# Patient Record
Sex: FEMALE | Race: WHITE | NOT HISPANIC OR LATINO | Employment: FULL TIME | URBAN - METROPOLITAN AREA
[De-identification: names, ages, dates, MRNs, and addresses within clinical notes are randomized per-mention and may not be internally consistent; named-entity substitution may affect disease eponyms.]

---

## 2023-04-10 ENCOUNTER — OFFICE VISIT (OUTPATIENT)
Dept: PEDIATRICS | Facility: CLINIC | Age: 23
End: 2023-04-10
Payer: COMMERCIAL

## 2023-04-10 VITALS
RESPIRATION RATE: 24 BRPM | SYSTOLIC BLOOD PRESSURE: 104 MMHG | WEIGHT: 166 LBS | HEIGHT: 65 IN | TEMPERATURE: 97.8 F | DIASTOLIC BLOOD PRESSURE: 60 MMHG | BODY MASS INDEX: 27.66 KG/M2 | HEART RATE: 72 BPM

## 2023-04-10 DIAGNOSIS — F32.A ANXIETY AND DEPRESSION: Primary | ICD-10-CM

## 2023-04-10 DIAGNOSIS — F41.9 ANXIETY AND DEPRESSION: Primary | ICD-10-CM

## 2023-04-10 PROCEDURE — 99213 OFFICE O/P EST LOW 20 MIN: CPT | Performed by: PEDIATRICS

## 2023-04-10 PROCEDURE — 1036F TOBACCO NON-USER: CPT | Performed by: PEDIATRICS

## 2023-04-10 RX ORDER — ESCITALOPRAM OXALATE 20 MG/1
20 TABLET ORAL DAILY
Qty: 90 TABLET | Refills: 0 | Status: SHIPPED | OUTPATIENT
Start: 2023-04-10 | End: 2023-04-12

## 2023-04-10 RX ORDER — ESCITALOPRAM OXALATE 20 MG/1
20 TABLET ORAL DAILY
COMMUNITY
End: 2023-04-12

## 2023-04-10 NOTE — PROGRESS NOTES
Subjective   Patient ID: Yanelis Carrillo is a 22 y.o. female who presents for med follow up (Lexapro 20mg ).  Today she is accompanied by alone.     HPI  On   Current Outpatient Medications:     escitalopram (Lexapro) 20 mg tablet, Take 1 tablet (20 mg) by mouth once daily., Disp: , Rfl:    Compliant Yes  EffectiveYes  Side effects No  Review of Systems    Objective   There were no vitals taken for this visit.  BSA: There is no height or weight on file to calculate BSA.    Physical Exam  Constitutional:       Appearance: Normal appearance.   Cardiovascular:      Rate and Rhythm: Normal rate and regular rhythm.   Pulmonary:      Breath sounds: Normal breath sounds.   Neurological:      General: No focal deficit present.      Mental Status: She is alert.   Psychiatric:         Mood and Affect: Mood normal.         Assessment/Plan   1. Anxiety and depression  escitalopram (Lexapro) 20 mg tablet    DISCONTINUED: escitalopram (Lexapro) 20 mg tablet

## 2023-04-12 RX ORDER — ESCITALOPRAM OXALATE 20 MG/1
20 TABLET ORAL DAILY
Qty: 90 TABLET | Refills: 0 | Status: SHIPPED | OUTPATIENT
Start: 2023-04-12 | End: 2023-07-10 | Stop reason: SDUPTHER

## 2023-07-03 ENCOUNTER — APPOINTMENT (OUTPATIENT)
Dept: PRIMARY CARE | Facility: CLINIC | Age: 23
End: 2023-07-03
Payer: COMMERCIAL

## 2023-07-10 DIAGNOSIS — F41.9 ANXIETY AND DEPRESSION: ICD-10-CM

## 2023-07-10 DIAGNOSIS — F32.A ANXIETY AND DEPRESSION: ICD-10-CM

## 2023-07-10 RX ORDER — ESCITALOPRAM OXALATE 20 MG/1
20 TABLET ORAL DAILY
Qty: 90 TABLET | Refills: 0 | Status: SHIPPED | OUTPATIENT
Start: 2023-07-10 | End: 2023-10-04 | Stop reason: SDUPTHER

## 2023-07-10 NOTE — TELEPHONE ENCOUNTER
Can you call and assist patient with scheduling. She is due for an apt and requested a refill on her medication.    Unsure when she is out of medication. If she is running out prior to apt please route back to me to pend and send to Dr Ayala.    Also when scheduling can you verify if she is still seeing Dr Ayala as Dr Coffey is here PCP in the system.

## 2023-07-10 NOTE — TELEPHONE ENCOUNTER
Patient requests prescription below    Last Office Visit: 4/10/2023     Requested Prescriptions     Pending Prescriptions Disp Refills    escitalopram (Lexapro) 20 mg tablet 90 tablet 0     Sig: Take 1 tablet (20 mg) by mouth once daily.

## 2023-08-31 LAB
CLUE CELLS: ABNORMAL
NUGENT SCORE: 1
YEAST: PRESENT

## 2023-09-21 ENCOUNTER — OFFICE VISIT (OUTPATIENT)
Dept: PRIMARY CARE CLINIC | Age: 23
End: 2023-09-21
Payer: COMMERCIAL

## 2023-09-21 VITALS
HEART RATE: 65 BPM | SYSTOLIC BLOOD PRESSURE: 120 MMHG | WEIGHT: 176 LBS | BODY MASS INDEX: 28.28 KG/M2 | HEIGHT: 66 IN | DIASTOLIC BLOOD PRESSURE: 74 MMHG | OXYGEN SATURATION: 98 % | TEMPERATURE: 99.2 F

## 2023-09-21 DIAGNOSIS — J01.90 ACUTE SINUSITIS, RECURRENCE NOT SPECIFIED, UNSPECIFIED LOCATION: Primary | ICD-10-CM

## 2023-09-21 DIAGNOSIS — R52 GENERALIZED BODY ACHES: ICD-10-CM

## 2023-09-21 DIAGNOSIS — R68.89 FLU-LIKE SYMPTOMS: ICD-10-CM

## 2023-09-21 DIAGNOSIS — J02.9 SORE THROAT: ICD-10-CM

## 2023-09-21 LAB
INFLUENZA A ANTIBODY: NEGATIVE
INFLUENZA B ANTIBODY: NEGATIVE
Lab: NORMAL
PERFORMING INSTRUMENT: NORMAL
QC PASS/FAIL: NORMAL
S PYO AG THROAT QL: NORMAL
SARS-COV-2, POC: NORMAL

## 2023-09-21 PROCEDURE — 87426 SARSCOV CORONAVIRUS AG IA: CPT | Performed by: STUDENT IN AN ORGANIZED HEALTH CARE EDUCATION/TRAINING PROGRAM

## 2023-09-21 PROCEDURE — G8427 DOCREV CUR MEDS BY ELIG CLIN: HCPCS | Performed by: STUDENT IN AN ORGANIZED HEALTH CARE EDUCATION/TRAINING PROGRAM

## 2023-09-21 PROCEDURE — 87804 INFLUENZA ASSAY W/OPTIC: CPT | Performed by: STUDENT IN AN ORGANIZED HEALTH CARE EDUCATION/TRAINING PROGRAM

## 2023-09-21 PROCEDURE — 4004F PT TOBACCO SCREEN RCVD TLK: CPT | Performed by: STUDENT IN AN ORGANIZED HEALTH CARE EDUCATION/TRAINING PROGRAM

## 2023-09-21 PROCEDURE — G8419 CALC BMI OUT NRM PARAM NOF/U: HCPCS | Performed by: STUDENT IN AN ORGANIZED HEALTH CARE EDUCATION/TRAINING PROGRAM

## 2023-09-21 PROCEDURE — 99203 OFFICE O/P NEW LOW 30 MIN: CPT | Performed by: STUDENT IN AN ORGANIZED HEALTH CARE EDUCATION/TRAINING PROGRAM

## 2023-09-21 PROCEDURE — 87880 STREP A ASSAY W/OPTIC: CPT | Performed by: STUDENT IN AN ORGANIZED HEALTH CARE EDUCATION/TRAINING PROGRAM

## 2023-09-21 RX ORDER — ESCITALOPRAM OXALATE 20 MG/1
20 TABLET ORAL DAILY
COMMUNITY
Start: 2023-07-10

## 2023-09-21 SDOH — ECONOMIC STABILITY: INCOME INSECURITY: HOW HARD IS IT FOR YOU TO PAY FOR THE VERY BASICS LIKE FOOD, HOUSING, MEDICAL CARE, AND HEATING?: NOT HARD AT ALL

## 2023-09-21 SDOH — ECONOMIC STABILITY: HOUSING INSECURITY
IN THE LAST 12 MONTHS, WAS THERE A TIME WHEN YOU DID NOT HAVE A STEADY PLACE TO SLEEP OR SLEPT IN A SHELTER (INCLUDING NOW)?: NO

## 2023-09-21 SDOH — ECONOMIC STABILITY: FOOD INSECURITY: WITHIN THE PAST 12 MONTHS, THE FOOD YOU BOUGHT JUST DIDN'T LAST AND YOU DIDN'T HAVE MONEY TO GET MORE.: NEVER TRUE

## 2023-09-21 SDOH — ECONOMIC STABILITY: FOOD INSECURITY: WITHIN THE PAST 12 MONTHS, YOU WORRIED THAT YOUR FOOD WOULD RUN OUT BEFORE YOU GOT MONEY TO BUY MORE.: NEVER TRUE

## 2023-09-21 ASSESSMENT — PATIENT HEALTH QUESTIONNAIRE - PHQ9
1. LITTLE INTEREST OR PLEASURE IN DOING THINGS: 0
SUM OF ALL RESPONSES TO PHQ QUESTIONS 1-9: 0
SUM OF ALL RESPONSES TO PHQ9 QUESTIONS 1 & 2: 0
SUM OF ALL RESPONSES TO PHQ QUESTIONS 1-9: 0
SUM OF ALL RESPONSES TO PHQ QUESTIONS 1-9: 0
2. FEELING DOWN, DEPRESSED OR HOPELESS: 0
SUM OF ALL RESPONSES TO PHQ QUESTIONS 1-9: 0

## 2023-09-21 NOTE — PATIENT INSTRUCTIONS
It is likely you have a viral infection as 90% of upper respiratory infections are viral.  The following treatments below are recommended for your symptoms. Please try these and reach out if your symptoms have not improved after 10 days from when they began.   -Vitamin C 1000 mg daily for 3 to 5 days  -Tylenol 500 to 1000 mg up to 3 times daily for aches and chills and fever  -Jazzy pot/saline nasal rinses/Sudafed/Flonase for nasal congestion, sinus pressure, nasal drainage  -Cepacol or Chloraseptic throat spray for throat pain  -Mucinex for cough

## 2023-10-02 ENCOUNTER — PATIENT MESSAGE (OUTPATIENT)
Dept: PEDIATRICS | Facility: CLINIC | Age: 23
End: 2023-10-02
Payer: COMMERCIAL

## 2023-10-02 DIAGNOSIS — F32.A ANXIETY AND DEPRESSION: ICD-10-CM

## 2023-10-02 DIAGNOSIS — F41.9 ANXIETY AND DEPRESSION: ICD-10-CM

## 2023-10-03 NOTE — PATIENT COMMUNICATION
Patient is requesting a refill on medication.    Initially advised patient to schedule apt prior to leaving but she leaves Thursday. Sent message offering apt today.  Wait reply

## 2023-10-04 NOTE — PATIENT COMMUNICATION
Patient unable to come in for apt prior to leaving.    Are you willing to refill medication?     Requested Prescriptions     Pending Prescriptions Disp Refills    escitalopram (Lexapro) 20 mg tablet 90 tablet 0     Sig: Take 1 tablet (20 mg) by mouth once daily.

## 2023-10-05 RX ORDER — ESCITALOPRAM OXALATE 20 MG/1
20 TABLET ORAL DAILY
Qty: 90 TABLET | Refills: 0 | Status: SHIPPED | OUTPATIENT
Start: 2023-10-05 | End: 2024-01-08 | Stop reason: SDUPTHER

## 2023-11-07 ENCOUNTER — APPOINTMENT (OUTPATIENT)
Dept: PRIMARY CARE | Facility: CLINIC | Age: 23
End: 2023-11-07
Payer: COMMERCIAL

## 2023-11-13 ENCOUNTER — HOSPITAL ENCOUNTER (INPATIENT)
Facility: HOSPITAL | Age: 23
LOS: 1 days | Discharge: ANOTHER HOSPITAL | End: 2023-11-14
Attending: EMERGENCY MEDICINE | Admitting: INTERNAL MEDICINE
Payer: COMMERCIAL

## 2023-11-13 ENCOUNTER — APPOINTMENT (OUTPATIENT)
Dept: RADIOLOGY | Facility: HOSPITAL | Age: 23
End: 2023-11-13
Payer: COMMERCIAL

## 2023-11-13 DIAGNOSIS — I44.2 THIRD DEGREE HEART BLOCK (MULTI): Primary | ICD-10-CM

## 2023-11-13 PROBLEM — F32.A ANXIETY AND DEPRESSION: Status: ACTIVE | Noted: 2023-11-13

## 2023-11-13 PROBLEM — F41.9 ANXIETY AND DEPRESSION: Status: ACTIVE | Noted: 2023-11-13

## 2023-11-13 PROBLEM — I44.1 SECOND DEGREE AV BLOCK: Status: ACTIVE | Noted: 2023-11-13

## 2023-11-13 PROBLEM — D64.9 ANEMIA: Status: ACTIVE | Noted: 2023-11-13

## 2023-11-13 PROBLEM — I82.890 SUPERFICIAL VEIN THROMBOSIS: Status: ACTIVE | Noted: 2023-11-13

## 2023-11-13 PROBLEM — O13.9 GESTATIONAL HYPERTENSION (HHS-HCC): Status: ACTIVE | Noted: 2023-11-13

## 2023-11-13 PROBLEM — I82.890 SUPERFICIAL VEIN THROMBOSIS: Status: RESOLVED | Noted: 2023-11-13 | Resolved: 2023-11-13

## 2023-11-13 PROBLEM — I82.819 ACUTE SUPERFICIAL VENOUS THROMBOSIS OF LOWER EXTREMITY: Status: ACTIVE | Noted: 2023-11-13

## 2023-11-13 LAB
ALBUMIN SERPL BCP-MCNC: 3.5 G/DL (ref 3.4–5)
ALP SERPL-CCNC: 100 U/L (ref 33–110)
ALT SERPL W P-5'-P-CCNC: 16 U/L (ref 7–45)
ANION GAP SERPL CALC-SCNC: 9 MMOL/L (ref 10–20)
AST SERPL W P-5'-P-CCNC: 17 U/L (ref 9–39)
B-HCG SERPL-ACNC: <2 MIU/ML
BASOPHILS # BLD AUTO: 0.04 X10*3/UL (ref 0–0.1)
BASOPHILS NFR BLD AUTO: 0.7 %
BILIRUB SERPL-MCNC: 0.3 MG/DL (ref 0–1.2)
BUN SERPL-MCNC: 12 MG/DL (ref 6–23)
CALCIUM SERPL-MCNC: 9 MG/DL (ref 8.6–10.3)
CARDIAC TROPONIN I PNL SERPL HS: 3 NG/L (ref 0–13)
CARDIAC TROPONIN I PNL SERPL HS: 3 NG/L (ref 0–13)
CHLORIDE SERPL-SCNC: 103 MMOL/L (ref 98–107)
CO2 SERPL-SCNC: 27 MMOL/L (ref 21–32)
CREAT SERPL-MCNC: 0.81 MG/DL (ref 0.5–1.05)
EOSINOPHIL # BLD AUTO: 0.11 X10*3/UL (ref 0–0.7)
EOSINOPHIL NFR BLD AUTO: 2 %
ERYTHROCYTE [DISTWIDTH] IN BLOOD BY AUTOMATED COUNT: 15.1 % (ref 11.5–14.5)
GFR SERPL CREATININE-BSD FRML MDRD: >90 ML/MIN/1.73M*2
GLUCOSE SERPL-MCNC: 94 MG/DL (ref 74–99)
HCT VFR BLD AUTO: 40.3 % (ref 36–46)
HGB BLD-MCNC: 11.9 G/DL (ref 12–16)
IMM GRANULOCYTES # BLD AUTO: 0.01 X10*3/UL (ref 0–0.7)
IMM GRANULOCYTES NFR BLD AUTO: 0.2 % (ref 0–0.9)
LYMPHOCYTES # BLD AUTO: 1.99 X10*3/UL (ref 1.2–4.8)
LYMPHOCYTES NFR BLD AUTO: 35.3 %
MAGNESIUM SERPL-MCNC: 1.98 MG/DL (ref 1.6–2.4)
MCH RBC QN AUTO: 23.4 PG (ref 26–34)
MCHC RBC AUTO-ENTMCNC: 29.5 G/DL (ref 32–36)
MCV RBC AUTO: 79 FL (ref 80–100)
MONOCYTES # BLD AUTO: 0.42 X10*3/UL (ref 0.1–1)
MONOCYTES NFR BLD AUTO: 7.5 %
NEUTROPHILS # BLD AUTO: 3.06 X10*3/UL (ref 1.2–7.7)
NEUTROPHILS NFR BLD AUTO: 54.3 %
NRBC BLD-RTO: 0 /100 WBCS (ref 0–0)
PLATELET # BLD AUTO: 232 X10*3/UL (ref 150–450)
POTASSIUM SERPL-SCNC: 4 MMOL/L (ref 3.5–5.3)
PROT SERPL-MCNC: 7.2 G/DL (ref 6.4–8.2)
RBC # BLD AUTO: 5.08 X10*6/UL (ref 4–5.2)
SODIUM SERPL-SCNC: 135 MMOL/L (ref 136–145)
WBC # BLD AUTO: 5.6 X10*3/UL (ref 4.4–11.3)

## 2023-11-13 PROCEDURE — 83036 HEMOGLOBIN GLYCOSYLATED A1C: CPT | Mod: STJLAB | Performed by: NURSE PRACTITIONER

## 2023-11-13 PROCEDURE — 85025 COMPLETE CBC W/AUTO DIFF WBC: CPT | Performed by: STUDENT IN AN ORGANIZED HEALTH CARE EDUCATION/TRAINING PROGRAM

## 2023-11-13 PROCEDURE — 84484 ASSAY OF TROPONIN QUANT: CPT | Performed by: STUDENT IN AN ORGANIZED HEALTH CARE EDUCATION/TRAINING PROGRAM

## 2023-11-13 PROCEDURE — 36415 COLL VENOUS BLD VENIPUNCTURE: CPT | Performed by: EMERGENCY MEDICINE

## 2023-11-13 PROCEDURE — 84702 CHORIONIC GONADOTROPIN TEST: CPT | Performed by: STUDENT IN AN ORGANIZED HEALTH CARE EDUCATION/TRAINING PROGRAM

## 2023-11-13 PROCEDURE — 84484 ASSAY OF TROPONIN QUANT: CPT | Performed by: EMERGENCY MEDICINE

## 2023-11-13 PROCEDURE — 84443 ASSAY THYROID STIM HORMONE: CPT | Performed by: NURSE PRACTITIONER

## 2023-11-13 PROCEDURE — 80053 COMPREHEN METABOLIC PANEL: CPT | Performed by: EMERGENCY MEDICINE

## 2023-11-13 PROCEDURE — 36415 COLL VENOUS BLD VENIPUNCTURE: CPT | Performed by: STUDENT IN AN ORGANIZED HEALTH CARE EDUCATION/TRAINING PROGRAM

## 2023-11-13 PROCEDURE — 84439 ASSAY OF FREE THYROXINE: CPT | Performed by: NURSE PRACTITIONER

## 2023-11-13 PROCEDURE — 71045 X-RAY EXAM CHEST 1 VIEW: CPT | Mod: FR

## 2023-11-13 PROCEDURE — 99285 EMERGENCY DEPT VISIT HI MDM: CPT | Mod: 25 | Performed by: EMERGENCY MEDICINE

## 2023-11-13 PROCEDURE — 83735 ASSAY OF MAGNESIUM: CPT | Performed by: NURSE PRACTITIONER

## 2023-11-13 PROCEDURE — 85025 COMPLETE CBC W/AUTO DIFF WBC: CPT | Performed by: EMERGENCY MEDICINE

## 2023-11-13 PROCEDURE — 99285 EMERGENCY DEPT VISIT HI MDM: CPT | Performed by: EMERGENCY MEDICINE

## 2023-11-13 PROCEDURE — 2500000001 HC RX 250 WO HCPCS SELF ADMINISTERED DRUGS (ALT 637 FOR MEDICARE OP): Performed by: NURSE PRACTITIONER

## 2023-11-13 PROCEDURE — 84702 CHORIONIC GONADOTROPIN TEST: CPT | Performed by: EMERGENCY MEDICINE

## 2023-11-13 PROCEDURE — 80053 COMPREHEN METABOLIC PANEL: CPT | Performed by: STUDENT IN AN ORGANIZED HEALTH CARE EDUCATION/TRAINING PROGRAM

## 2023-11-13 PROCEDURE — 71045 X-RAY EXAM CHEST 1 VIEW: CPT | Mod: FOREIGN READ | Performed by: RADIOLOGY

## 2023-11-13 PROCEDURE — 99222 1ST HOSP IP/OBS MODERATE 55: CPT | Performed by: NURSE PRACTITIONER

## 2023-11-13 PROCEDURE — 99222 1ST HOSP IP/OBS MODERATE 55: CPT | Performed by: INTERNAL MEDICINE

## 2023-11-13 RX ORDER — SODIUM CHLORIDE 9 MG/ML
75 INJECTION, SOLUTION INTRAVENOUS CONTINUOUS
Status: DISCONTINUED | OUTPATIENT
Start: 2023-11-13 | End: 2023-11-14 | Stop reason: HOSPADM

## 2023-11-13 RX ORDER — POLYETHYLENE GLYCOL 3350 17 G/17G
17 POWDER, FOR SOLUTION ORAL DAILY PRN
Status: DISCONTINUED | OUTPATIENT
Start: 2023-11-13 | End: 2023-11-14 | Stop reason: HOSPADM

## 2023-11-13 RX ORDER — ACETAMINOPHEN 650 MG/1
650 SUPPOSITORY RECTAL EVERY 4 HOURS PRN
Status: DISCONTINUED | OUTPATIENT
Start: 2023-11-13 | End: 2023-11-14 | Stop reason: HOSPADM

## 2023-11-13 RX ORDER — ESCITALOPRAM OXALATE 20 MG/1
20 TABLET ORAL NIGHTLY
Status: DISCONTINUED | OUTPATIENT
Start: 2023-11-13 | End: 2023-11-14 | Stop reason: HOSPADM

## 2023-11-13 RX ORDER — ACETAMINOPHEN 325 MG/1
650 TABLET ORAL EVERY 4 HOURS PRN
Status: DISCONTINUED | OUTPATIENT
Start: 2023-11-13 | End: 2023-11-14 | Stop reason: HOSPADM

## 2023-11-13 RX ORDER — TALC
3 POWDER (GRAM) TOPICAL DAILY PRN
Status: DISCONTINUED | OUTPATIENT
Start: 2023-11-13 | End: 2023-11-14 | Stop reason: HOSPADM

## 2023-11-13 RX ORDER — ACETAMINOPHEN 160 MG/5ML
650 SOLUTION ORAL EVERY 4 HOURS PRN
Status: DISCONTINUED | OUTPATIENT
Start: 2023-11-13 | End: 2023-11-14 | Stop reason: HOSPADM

## 2023-11-13 ASSESSMENT — LIFESTYLE VARIABLES
HAVE PEOPLE ANNOYED YOU BY CRITICIZING YOUR DRINKING: NO
HAVE YOU EVER FELT YOU SHOULD CUT DOWN ON YOUR DRINKING: NO
REASON UNABLE TO ASSESS: NO
EVER HAD A DRINK FIRST THING IN THE MORNING TO STEADY YOUR NERVES TO GET RID OF A HANGOVER: NO
EVER FELT BAD OR GUILTY ABOUT YOUR DRINKING: NO

## 2023-11-13 ASSESSMENT — COLUMBIA-SUICIDE SEVERITY RATING SCALE - C-SSRS
1. IN THE PAST MONTH, HAVE YOU WISHED YOU WERE DEAD OR WISHED YOU COULD GO TO SLEEP AND NOT WAKE UP?: NO
6. HAVE YOU EVER DONE ANYTHING, STARTED TO DO ANYTHING, OR PREPARED TO DO ANYTHING TO END YOUR LIFE?: NO
2. HAVE YOU ACTUALLY HAD ANY THOUGHTS OF KILLING YOURSELF?: NO

## 2023-11-13 ASSESSMENT — PAIN DESCRIPTION - LOCATION: LOCATION: CHEST

## 2023-11-13 ASSESSMENT — PAIN - FUNCTIONAL ASSESSMENT: PAIN_FUNCTIONAL_ASSESSMENT: 0-10

## 2023-11-13 ASSESSMENT — PAIN SCALES - GENERAL
PAINLEVEL_OUTOF10: 3
PAINLEVEL_OUTOF10: 4
PAINLEVEL_OUTOF10: 5 - MODERATE PAIN
PAINLEVEL_OUTOF10: 5 - MODERATE PAIN

## 2023-11-13 ASSESSMENT — PAIN DESCRIPTION - DESCRIPTORS
DESCRIPTORS: ACHING
DESCRIPTORS: ACHING

## 2023-11-13 NOTE — CONSULTS
Consults\  Reason for Consult: Dizziness and chest pain    Assessment/Plan:    Third-degree heart block: EKG shows a sinus rate of nearly 100 bpm with a junctional escape rhythm of around 50 bpm with no evidence of AV conduction.  EKG is similar to that of July 2022.  Unclear etiology.  Certainly infiltrative disease needs to be considered however typically this presents with QRS widening.  Cardiac MRI is certainly warranted.  There is a chance this actually may be congenital heart block that she is gone unnoticed over time.  I discussed case with electrophysiology.  Given the new symptoms we felt referral to [facility] for cardiac MRI and possible diagnostic electrophysiology study was warranted.  Patient and father were agreeable with that plan.          History Of Present Illness:    Yanelis Carrillo is a 23 y.o. female presenting with chest discomfort and dizziness.  Patient works as an MA.  She was diagnosed with some type of arrhythmia in July of last year after giving birth.  Her EKG is read as second-degree heart block however do not agree with that interpretation.  She had echocardiogram performed at that time showing no significant valvular heart disease and normal LV systolic function.  She has never had a syncopal event.  Fortunately over the last day she has had some atypical sharp chest discomfort that comes and goes.  She does have a history of anxiety as well.  In addition she describes some dizziness.  The dizziness was somewhat reproduced with changes in head position.  She can feel her heart beating forcefully but not irregularly nor fast.  She really does not exercise much.  She does not track her heart rate with a Fitbit or with a watch.  Again no syncopal events.  No family history of sudden cardiac death.  Mother has some type of valvular heart disease that is ill-defined.  Father has a low resting heart rate but no issues with significant symptomatic bradycardia..       Past Medical History:  She has a  past medical history of Pain in unspecified ankle and joints of unspecified foot, Personal history of other (healed) physical injury and trauma, Personal history of other (healed) physical injury and trauma, Personal history of other diseases of the female genital tract, Personal history of other diseases of the respiratory system, and Personal history of other specified conditions.    Past Surgical History:  She has a past surgical history that includes Other surgical history (08/09/2019).    Problem List:  There is no problem list on file for this patient.      Social History:  She reports that she has never smoked. She has never been exposed to tobacco smoke. She has never used smokeless tobacco. No history on file for alcohol use and drug use.    Family History:  No family history on file.     Allergies:  Patient has no known allergies.    Inpatient Medications:        Outpatient Medications:  Current Outpatient Medications   Medication Instructions    escitalopram (LEXAPRO) 20 mg, oral, Daily       Last Recorded Vitals:  Vitals:    11/13/23 1200 11/13/23 1300 11/13/23 1330 11/13/23 1400   BP: 131/70 145/70 145/70    Pulse: 50 54 52 54   Resp: 18 18 26 20   Temp:       SpO2: 99% 100% 99% 99%   Weight:       Height:         Last I/O:  No intake/output data recorded.    Physical Exam  Vitals reviewed.   Constitutional:       Appearance: Normal appearance.   Eyes:      Pupils: Pupils are equal, round, and reactive to light.   Neck:      Vascular: No JVD.   Cardiovascular:      Rate and Rhythm: Regular rhythm. Bradycardia present.      Pulses: Normal pulses.      Heart sounds: No murmur heard.     No gallop.   Pulmonary:      Effort: No respiratory distress.      Breath sounds: No wheezing or rales.   Abdominal:      General: Abdomen is flat. There is no distension.      Palpations: Abdomen is soft.   Musculoskeletal:         General: No swelling.      Right lower leg: No edema.      Left lower leg: No edema.    Neurological:      General: No focal deficit present.      Mental Status: She is alert.   Psychiatric:         Mood and Affect: Mood normal.            Last Labs:  CBC - 11/13/2023: 11:28 AM  5.6 11.9 232    40.3      CMP - 11/13/2023: 11:28 AM  9.0 7.2 17 --- 0.3   _ 3.5 16 100      Troponin I, High Sensitivity   Date/Time Value Ref Range Status   11/13/2023 01:36 PM 3 0 - 13 ng/L Final   11/13/2023 11:28 AM 3 0 - 13 ng/L Final     Hemoglobin A1C   Date/Time Value Ref Range Status   07/24/2022 03:30 AM 5.6 % Final     Comment:          Diagnosis of Diabetes-Adults   Non-Diabetic: < or = 5.6%   Increased risk for developing diabetes: 5.7-6.4%   Diagnostic of diabetes: > or = 6.5%  .       Monitoring of Diabetes                Age (y)     Therapeutic Goal (%)   Adults:          >18           <7.0   Pediatrics:    13-18           <7.5                   7-12           <8.0                   0- 6            7.5-8.5   American Diabetes Association. Diabetes Care 33(S1), Jan 2010.       VLDL   Date/Time Value Ref Range Status   07/24/2022 03:30 AM 36 0 - 40 mg/dL Final     ECG normal sinus rhythm with a heart rate of around 110 bpm with complete heart block and a junctional escape with a narrow complex QRS at a rate of about 50-54 bpm.  No evidence of AV conduction.    Paul Hagan MD
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
## 2023-11-13 NOTE — ED NOTES
1831 spoke with Uriel for an update on patient to be transferred   Awaiting a bed      Michelle Suero, EMT  11/13/23 1832

## 2023-11-14 ENCOUNTER — DOCUMENTATION (OUTPATIENT)
Dept: RADIOLOGY | Facility: HOSPITAL | Age: 23
End: 2023-11-14
Payer: COMMERCIAL

## 2023-11-14 ENCOUNTER — HOSPITAL ENCOUNTER (INPATIENT)
Facility: HOSPITAL | Age: 23
LOS: 3 days | Discharge: HOME | End: 2023-11-17
Attending: INTERNAL MEDICINE | Admitting: INTERNAL MEDICINE
Payer: COMMERCIAL

## 2023-11-14 ENCOUNTER — APPOINTMENT (OUTPATIENT)
Dept: CARDIOLOGY | Facility: HOSPITAL | Age: 23
End: 2023-11-14
Payer: COMMERCIAL

## 2023-11-14 VITALS
SYSTOLIC BLOOD PRESSURE: 115 MMHG | DIASTOLIC BLOOD PRESSURE: 58 MMHG | HEART RATE: 55 BPM | TEMPERATURE: 98.2 F | BODY MASS INDEX: 28.34 KG/M2 | RESPIRATION RATE: 18 BRPM | WEIGHT: 176.37 LBS | HEIGHT: 66 IN | OXYGEN SATURATION: 98 %

## 2023-11-14 DIAGNOSIS — I45.9 HEART BLOCK: Primary | ICD-10-CM

## 2023-11-14 DIAGNOSIS — F41.9 ANXIETY AND DEPRESSION: ICD-10-CM

## 2023-11-14 DIAGNOSIS — I44.2 THIRD DEGREE HEART BLOCK (MULTI): ICD-10-CM

## 2023-11-14 DIAGNOSIS — F32.A ANXIETY AND DEPRESSION: ICD-10-CM

## 2023-11-14 LAB
AMPHETAMINES UR QL SCN: NORMAL
ANION GAP SERPL CALC-SCNC: 12 MMOL/L (ref 10–20)
BARBITURATES UR QL SCN: NORMAL
BENZODIAZ UR QL SCN: NORMAL
BUN SERPL-MCNC: 13 MG/DL (ref 6–23)
BZE UR QL SCN: NORMAL
CALCIUM SERPL-MCNC: 8.3 MG/DL (ref 8.6–10.3)
CANNABINOIDS UR QL SCN: NORMAL
CHLORIDE SERPL-SCNC: 107 MMOL/L (ref 98–107)
CHOLEST SERPL-MCNC: 170 MG/DL (ref 0–199)
CHOLESTEROL/HDL RATIO: 3.6
CO2 SERPL-SCNC: 24 MMOL/L (ref 21–32)
CREAT SERPL-MCNC: 0.83 MG/DL (ref 0.5–1.05)
ERYTHROCYTE [DISTWIDTH] IN BLOOD BY AUTOMATED COUNT: 15.3 % (ref 11.5–14.5)
EST. AVERAGE GLUCOSE BLD GHB EST-MCNC: 111 MG/DL
FENTANYL+NORFENTANYL UR QL SCN: NORMAL
FERRITIN SERPL-MCNC: 12 NG/ML (ref 8–150)
FOLATE SERPL-MCNC: 13.4 NG/ML
GFR SERPL CREATININE-BSD FRML MDRD: >90 ML/MIN/1.73M*2
GLUCOSE SERPL-MCNC: 99 MG/DL (ref 74–99)
HBA1C MFR BLD: 5.5 %
HCT VFR BLD AUTO: 38.4 % (ref 36–46)
HDLC SERPL-MCNC: 47.6 MG/DL
HGB BLD-MCNC: 11.3 G/DL (ref 12–16)
HOLD SPECIMEN: NORMAL
IRON SATN MFR SERPL: ABNORMAL %
IRON SERPL-MCNC: 56 UG/DL (ref 35–150)
LDLC SERPL CALC-MCNC: 108 MG/DL
MAGNESIUM SERPL-MCNC: 2.13 MG/DL (ref 1.6–2.4)
MCH RBC QN AUTO: 23.1 PG (ref 26–34)
MCHC RBC AUTO-ENTMCNC: 29.4 G/DL (ref 32–36)
MCV RBC AUTO: 78 FL (ref 80–100)
NON HDL CHOLESTEROL: 122 MG/DL (ref 0–149)
NRBC BLD-RTO: 0 /100 WBCS (ref 0–0)
OPIATES UR QL SCN: NORMAL
OXYCODONE+OXYMORPHONE UR QL SCN: NORMAL
PCP UR QL SCN: NORMAL
PHOSPHATE SERPL-MCNC: 4.1 MG/DL (ref 2.5–4.9)
PLATELET # BLD AUTO: 210 X10*3/UL (ref 150–450)
POTASSIUM SERPL-SCNC: 3.8 MMOL/L (ref 3.5–5.3)
RBC # BLD AUTO: 4.9 X10*6/UL (ref 4–5.2)
SODIUM SERPL-SCNC: 139 MMOL/L (ref 136–145)
T4 FREE SERPL-MCNC: 1.05 NG/DL (ref 0.61–1.12)
TIBC SERPL-MCNC: ABNORMAL UG/DL
TRIGL SERPL-MCNC: 70 MG/DL (ref 0–149)
TSH SERPL-ACNC: 2.36 MIU/L (ref 0.44–3.98)
UIBC SERPL-MCNC: >450 UG/DL (ref 110–370)
VIT B12 SERPL-MCNC: 355 PG/ML (ref 211–911)
VLDL: 14 MG/DL (ref 0–40)
WBC # BLD AUTO: 4.9 X10*3/UL (ref 4.4–11.3)

## 2023-11-14 PROCEDURE — 87476 LYME DIS DNA AMP PROBE: CPT

## 2023-11-14 PROCEDURE — 82746 ASSAY OF FOLIC ACID SERUM: CPT | Mod: STJLAB | Performed by: NURSE PRACTITIONER

## 2023-11-14 PROCEDURE — 2500000001 HC RX 250 WO HCPCS SELF ADMINISTERED DRUGS (ALT 637 FOR MEDICARE OP)

## 2023-11-14 PROCEDURE — 2500000004 HC RX 250 GENERAL PHARMACY W/ HCPCS (ALT 636 FOR OP/ED): Performed by: NURSE PRACTITIONER

## 2023-11-14 PROCEDURE — 36415 COLL VENOUS BLD VENIPUNCTURE: CPT

## 2023-11-14 PROCEDURE — 84100 ASSAY OF PHOSPHORUS: CPT | Performed by: NURSE PRACTITIONER

## 2023-11-14 PROCEDURE — 2060000001 HC INTERMEDIATE ICU ROOM DAILY

## 2023-11-14 PROCEDURE — 36415 COLL VENOUS BLD VENIPUNCTURE: CPT | Performed by: NURSE PRACTITIONER

## 2023-11-14 PROCEDURE — 80307 DRUG TEST PRSMV CHEM ANLYZR: CPT | Performed by: NURSE PRACTITIONER

## 2023-11-14 PROCEDURE — 83735 ASSAY OF MAGNESIUM: CPT | Performed by: NURSE PRACTITIONER

## 2023-11-14 PROCEDURE — 82607 VITAMIN B-12: CPT | Mod: STJLAB | Performed by: NURSE PRACTITIONER

## 2023-11-14 PROCEDURE — 80048 BASIC METABOLIC PNL TOTAL CA: CPT | Performed by: NURSE PRACTITIONER

## 2023-11-14 PROCEDURE — 82728 ASSAY OF FERRITIN: CPT | Performed by: NURSE PRACTITIONER

## 2023-11-14 PROCEDURE — 83540 ASSAY OF IRON: CPT | Performed by: NURSE PRACTITIONER

## 2023-11-14 PROCEDURE — 85027 COMPLETE CBC AUTOMATED: CPT | Performed by: NURSE PRACTITIONER

## 2023-11-14 PROCEDURE — 85613 RUSSELL VIPER VENOM DILUTED: CPT

## 2023-11-14 PROCEDURE — 80061 LIPID PANEL: CPT | Performed by: NURSE PRACTITIONER

## 2023-11-14 PROCEDURE — 99223 1ST HOSP IP/OBS HIGH 75: CPT

## 2023-11-14 PROCEDURE — 1200000002 HC GENERAL ROOM WITH TELEMETRY DAILY

## 2023-11-14 PROCEDURE — 93010 ELECTROCARDIOGRAM REPORT: CPT | Performed by: INTERNAL MEDICINE

## 2023-11-14 RX ORDER — ACETAMINOPHEN 160 MG/5ML
650 SOLUTION ORAL EVERY 4 HOURS PRN
Status: DISCONTINUED | OUTPATIENT
Start: 2023-11-14 | End: 2023-11-16

## 2023-11-14 RX ORDER — POLYETHYLENE GLYCOL 3350 17 G/17G
17 POWDER, FOR SOLUTION ORAL DAILY
Status: DISCONTINUED | OUTPATIENT
Start: 2023-11-14 | End: 2023-11-17 | Stop reason: HOSPADM

## 2023-11-14 RX ORDER — ESCITALOPRAM OXALATE 20 MG/1
20 TABLET ORAL DAILY
Status: DISCONTINUED | OUTPATIENT
Start: 2023-11-14 | End: 2023-11-17 | Stop reason: HOSPADM

## 2023-11-14 RX ORDER — ACETAMINOPHEN 650 MG/1
650 SUPPOSITORY RECTAL EVERY 4 HOURS PRN
Status: DISCONTINUED | OUTPATIENT
Start: 2023-11-14 | End: 2023-11-16

## 2023-11-14 RX ORDER — ACETAMINOPHEN 325 MG/1
650 TABLET ORAL EVERY 6 HOURS PRN
Status: DISCONTINUED | OUTPATIENT
Start: 2023-11-14 | End: 2023-11-14

## 2023-11-14 RX ORDER — ACETAMINOPHEN 325 MG/1
650 TABLET ORAL EVERY 4 HOURS PRN
Status: DISCONTINUED | OUTPATIENT
Start: 2023-11-14 | End: 2023-11-16

## 2023-11-14 RX ADMIN — SODIUM CHLORIDE 75 ML/HR: 9 INJECTION, SOLUTION INTRAVENOUS at 00:40

## 2023-11-14 RX ADMIN — ACETAMINOPHEN 650 MG: 325 TABLET ORAL at 07:59

## 2023-11-14 RX ADMIN — ACETAMINOPHEN 650 MG: 325 TABLET ORAL at 18:32

## 2023-11-14 RX ADMIN — ESCITALOPRAM OXALATE 20 MG: 20 TABLET ORAL at 21:42

## 2023-11-14 RX ADMIN — ACETAMINOPHEN 650 MG: 325 TABLET ORAL at 03:52

## 2023-11-14 SDOH — SOCIAL STABILITY: SOCIAL INSECURITY: DO YOU FEEL ANYONE HAS EXPLOITED OR TAKEN ADVANTAGE OF YOU FINANCIALLY OR OF YOUR PERSONAL PROPERTY?: NO

## 2023-11-14 SDOH — SOCIAL STABILITY: SOCIAL INSECURITY: HAS ANYONE EVER THREATENED TO HURT YOUR FAMILY OR YOUR PETS?: NO

## 2023-11-14 SDOH — SOCIAL STABILITY: SOCIAL INSECURITY: ABUSE: ADULT

## 2023-11-14 SDOH — SOCIAL STABILITY: SOCIAL INSECURITY: ARE THERE ANY APPARENT SIGNS OF INJURIES/BEHAVIORS THAT COULD BE RELATED TO ABUSE/NEGLECT?: NO

## 2023-11-14 SDOH — SOCIAL STABILITY: SOCIAL INSECURITY: DO YOU FEEL UNSAFE GOING BACK TO THE PLACE WHERE YOU ARE LIVING?: NO

## 2023-11-14 SDOH — SOCIAL STABILITY: SOCIAL INSECURITY: ARE YOU OR HAVE YOU BEEN THREATENED OR ABUSED PHYSICALLY, EMOTIONALLY, OR SEXUALLY BY ANYONE?: NO

## 2023-11-14 SDOH — SOCIAL STABILITY: SOCIAL INSECURITY: HAVE YOU HAD THOUGHTS OF HARMING ANYONE ELSE?: NO

## 2023-11-14 SDOH — SOCIAL STABILITY: SOCIAL INSECURITY: DOES ANYONE TRY TO KEEP YOU FROM HAVING/CONTACTING OTHER FRIENDS OR DOING THINGS OUTSIDE YOUR HOME?: NO

## 2023-11-14 ASSESSMENT — ACTIVITIES OF DAILY LIVING (ADL)
PATIENT'S MEMORY ADEQUATE TO SAFELY COMPLETE DAILY ACTIVITIES?: YES
ADEQUATE_TO_COMPLETE_ADL: YES
ADEQUATE_TO_COMPLETE_ADL: YES
DRESSING: INDEPENDENT
TOILETING: INDEPENDENT
WALKS_IN_HOME: INDEPENDENTLY
ADL_BEFORE_ADMISSION: INDEPENDENTLY
WALKS IN HOME: INDEPENDENT
TOILETING: INDEPENDENT
HOW_WELL_CAN_YOU_FEED_YOURSELF: INDEPENDENTLY
HEARING - RIGHT EAR: FUNCTIONAL
JUDGMENT_ADEQUATE_SAFELY_COMPLETE_DAILY_ACTIVITIES: YES
HOW_WELL_CAN_YOU_DRESS_YOURSELF: INDEPENDENTLY
LACK_OF_TRANSPORTATION: NO
BATHING: INDEPENDENT
HEARING - LEFT EAR: FUNCTIONAL
DRESSING YOURSELF: INDEPENDENT
ADEQUATE_TO_COMPLETE_ADL: YES
FEEDING YOURSELF: INDEPENDENT
BATHING: INDEPENDENT
HEARING_LEFT_EAR: NO PROBLEMS
HEARING_RIGHT_EAR: NO PROBLEMS
HOW_WELL_CAN_YOU_BATHE_YOURSELF: INDEPENDENTLY
ADL_BEFORE_ADMISSION: RIGHT
GROOMING: INDEPENDENT
HOW_WELL_CAN_YOU_USE_BATHROOM_BY_YOURSELF: INDEPENDENTLY
LACK_OF_TRANSPORTATION: NO
HOW_WELL_CAN_YOU_COMPLETE_GROOMING_TASKS: INDEPENDENTLY
FEEDING: INDEPENDENT

## 2023-11-14 ASSESSMENT — PAIN SCALES - GENERAL
PAINLEVEL_OUTOF10: 0 - NO PAIN
PAINLEVEL_OUTOF10: 4
PAINLEVEL_OUTOF10: 4

## 2023-11-14 ASSESSMENT — COGNITIVE AND FUNCTIONAL STATUS - GENERAL
DAILY ACTIVITIY SCORE: 24
PATIENT BASELINE BEDBOUND: NO
DAILY ACTIVITIY SCORE: 24
MOBILITY SCORE: 24
PATIENT BASELINE BEDBOUND: NO
DAILY ACTIVITIY SCORE: 24
MOBILITY SCORE: 24
MOBILITY SCORE: 24

## 2023-11-14 ASSESSMENT — PATIENT HEALTH QUESTIONNAIRE - PHQ9
SUM OF ALL RESPONSES TO PHQ9 QUESTIONS 1 & 2: 0
1. LITTLE INTEREST OR PLEASURE IN DOING THINGS: NOT AT ALL
2. FEELING DOWN, DEPRESSED OR HOPELESS: NOT AT ALL

## 2023-11-14 ASSESSMENT — LIFESTYLE VARIABLES
PRESCIPTION_ABUSE_PAST_12_MONTHS: NO
AUDIT-C TOTAL SCORE: 1
SUBSTANCE_ABUSE_PAST_12_MONTHS: NO
HOW MANY STANDARD DRINKS CONTAINING ALCOHOL DO YOU HAVE ON A TYPICAL DAY: 1 OR 2
HOW OFTEN DO YOU HAVE A DRINK CONTAINING ALCOHOL: MONTHLY OR LESS
SKIP TO QUESTIONS 9-10: 1
AUDIT-C TOTAL SCORE: 1
HOW OFTEN DO YOU HAVE 6 OR MORE DRINKS ON ONE OCCASION: NEVER

## 2023-11-14 ASSESSMENT — PAIN - FUNCTIONAL ASSESSMENT
PAIN_FUNCTIONAL_ASSESSMENT: 0-10

## 2023-11-14 ASSESSMENT — COLUMBIA-SUICIDE SEVERITY RATING SCALE - C-SSRS
1. IN THE PAST MONTH, HAVE YOU WISHED YOU WERE DEAD OR WISHED YOU COULD GO TO SLEEP AND NOT WAKE UP?: NO
2. HAVE YOU ACTUALLY HAD ANY THOUGHTS OF KILLING YOURSELF?: NO
6. HAVE YOU EVER DONE ANYTHING, STARTED TO DO ANYTHING, OR PREPARED TO DO ANYTHING TO END YOUR LIFE?: NO

## 2023-11-14 ASSESSMENT — PAIN DESCRIPTION - DESCRIPTORS: DESCRIPTORS: CRAMPING

## 2023-11-14 NOTE — H&P
History Of Present Illness  Yanelis Carrillo is a 23 y.o. female presenting to [facility] on 11/13/2023 with PMHx significant for 2nd degree AV block in 7/2022 (postpartum) (ECHO 7/24/2022 with a EF 60 to 65%-showing patient was in A-fib), anemia, anxiety, and depression. The patient presents to after transfer from OSH where she had with complaints of chest pain and lightheadedness. Patient reports that the episodes started last night, describes it as sharp midsternal, and radiating, and last about 5-10 seconds intermittently with no clear causal factors.  Patient continues to rate pain 4/10 on the pain scale.  Patient denies taking new medications or illicit drugs.  During interview the patient denies any recent fever/chills, headache, dizziness, palpitations, shortness of breath, cough, abdominal pain, N/V/D/C, dysuria, or hematuria. She reports that she is still weaning of lactation.    While in the ED at OSH, renal and liver function WNL.  High-sensitivity troponin 1 negative x2 at 3> 3.  CBC with WBC, hemoglobin, and hematocrit WNL.  Chest x-ray negative.  EKG: Third-degree AV block, ventricular rate 51, VA *, QRS 84, QTc 100. No ST elevation or depression noted.  ED physician spoke to Dr. Hagan who recommended patient be transferred to [facility] for further evaluation with cardiac MRI and possible diagnostic EP study.      Past Medical History  She has a past medical history of Acute superficial venous thrombosis of lower extremity (11/13/2023), Anemia (11/13/2023), Anxiety and depression (11/13/2023), Gestational hypertension (11/13/2023), Pain in unspecified ankle and joints of unspecified foot, Personal history of other (healed) physical injury and trauma, Personal history of other (healed) physical injury and trauma, Personal history of other diseases of the female genital tract, Personal history of other diseases of the respiratory system, Personal history of other specified conditions, and Second degree AV block  (2023).    Surgical History  She has a past surgical history that includes  section, low transverse (2022).     Social History  She reports that she has never smoked. She has never been exposed to tobacco smoke. She has never used smokeless tobacco. She reports that she does not drink alcohol and does not use drugs.    Family History  Family History   Problem Relation Name Age of Onset    Other (sinus tachycardia) Mother      Skin cancer Paternal Grandfather          Allergies  Cefdinir, Metronidazole, Peanut, and Cephalexin      ROS Negative except as per HPI     Physical Exam  Constitutional: Awake and alert, Calm, and Cooperative. Speech clear. No acute distress.  Skin: Pink, warm, and dry. No lesions or rashes.  Eyes: PERRL, sclera clear, No swelling or drainage noted  ENMT: Mucous membranes pink and moist, no apparent injury, no lesions seen  Head/Neck: Neck Supple, no apparent injury, trachea midline, no palpable masses on head  Cardiac: Regular rhythm with bradycardia, Auscultated S1 & S2, no murmurs  Lungs: CTAB, symmetrical chest rise, no accessory muscle usage, unlabored  Abdomen: Soft, non-tender, no rebound tenderness or guarding, nondistended, positive bowel sounds in all quadrants  Musculoskeletal: ROM intact, no joint swelling, normal strength  Extremities: No edema, No cyanosis, 2+ pulses of the extremities, see skin assessment for wounds  Neurological: Alert and Oriented x 3, intact senses, bilateral hand grasps and foot push/pulls equal.  Psychological: Appropriate mood and behavior.       Last Recorded Vitals  Blood pressure 128/64, pulse 56, temperature 37 °C (98.6 °F), resp. rate 18, last menstrual period 2023, SpO2 98 %.  Visit Vitals  /64   Pulse 56   Temp 37 °C (98.6 °F)   Resp 18   LMP 2023 Comment: negative pregnancy test today   SpO2 98%   OB Status Having periods   Smoking Status Never         Pain Assessment: 0-10  Pain Score: 4  Pain Type: Acute  pain  Pain Location: Abdomen  Pain Descriptors: Cramping        Relevant Results  Results for orders placed or performed during the hospital encounter of 11/13/23 (from the past 24 hour(s))   Drug Screen, Urine   Result Value Ref Range    Amphetamine Screen, Urine Presumptive Negative Presumptive Negative    Barbiturate Screen, Urine Presumptive Negative Presumptive Negative    Benzodiazepines Screen, Urine Presumptive Negative Presumptive Negative    Cannabinoid Screen, Urine Presumptive Negative Presumptive Negative    Cocaine Metabolite Screen, Urine Presumptive Negative Presumptive Negative    Fentanyl Screen, Urine Presumptive Negative Presumptive Negative    Opiate Screen, Urine Presumptive Negative Presumptive Negative    Oxycodone Screen, Urine Presumptive Negative Presumptive Negative    PCP Screen, Urine Presumptive Negative Presumptive Negative   Magnesium   Result Value Ref Range    Magnesium 2.13 1.60 - 2.40 mg/dL   CBC   Result Value Ref Range    WBC 4.9 4.4 - 11.3 x10*3/uL    nRBC 0.0 0.0 - 0.0 /100 WBCs    RBC 4.90 4.00 - 5.20 x10*6/uL    Hemoglobin 11.3 (L) 12.0 - 16.0 g/dL    Hematocrit 38.4 36.0 - 46.0 %    MCV 78 (L) 80 - 100 fL    MCH 23.1 (L) 26.0 - 34.0 pg    MCHC 29.4 (L) 32.0 - 36.0 g/dL    RDW 15.3 (H) 11.5 - 14.5 %    Platelets 210 150 - 450 x10*3/uL   Basic metabolic panel   Result Value Ref Range    Glucose 99 74 - 99 mg/dL    Sodium 139 136 - 145 mmol/L    Potassium 3.8 3.5 - 5.3 mmol/L    Chloride 107 98 - 107 mmol/L    Bicarbonate 24 21 - 32 mmol/L    Anion Gap 12 10 - 20 mmol/L    Urea Nitrogen 13 6 - 23 mg/dL    Creatinine 0.83 0.50 - 1.05 mg/dL    eGFR >90 >60 mL/min/1.73m*2    Calcium 8.3 (L) 8.6 - 10.3 mg/dL   Phosphorus   Result Value Ref Range    Phosphorus 4.1 2.5 - 4.9 mg/dL   Ferritin   Result Value Ref Range    Ferritin 12 8 - 150 ng/mL   Iron and TIBC   Result Value Ref Range    Iron 56 35 - 150 ug/dL    UIBC >450 (H) 110 - 370 ug/dL    TIBC      % Saturation     Lipid  Panel   Result Value Ref Range    Cholesterol 170 0 - 199 mg/dL    HDL-Cholesterol 47.6 mg/dL    Cholesterol/HDL Ratio 3.6     LDL Calculated 108 <=119 mg/dL    VLDL 14 0 - 40 mg/dL    Triglycerides 70 0 - 149 mg/dL    Non HDL Cholesterol 122 0 - 149 mg/dL      XR chest 1 view    Result Date: 11/13/2023  STUDY: Chest Radiograph;  11/13/2023, 11:36AM. INDICATION: Chest pain. COMPARISON: None. ACCESSION NUMBER(S): [id number] TECHNIQUE:  Frontal chest was obtained at 11:34 hours. FINDINGS: CARDIOMEDIASTINAL SILHOUETTE: Cardiomediastinal silhouette is normal in size and configuration.  LUNGS: Lungs are clear.  ABDOMEN: No remarkable upper abdominal findings.  BONES: No acute osseous changes.    No regions of airspace consolidation.  No pneumothorax.  Normal cardiac size. Signed by Austin Leavitt MD         Home Medications  Prior to Admission medications    Medication Sig Start Date End Date Taking? Authorizing Provider   escitalopram (Lexapro) 20 mg tablet Take 1 tablet (20 mg) by mouth once daily.  Patient taking differently: Take 1 tablet (20 mg) by mouth once daily at bedtime. 10/5/23   Veronika Ayala MD       Medications  Scheduled medications  escitalopram, 20 mg, oral, Daily  polyethylene glycol, 17 g, oral, Daily        Assessment/Plan   Yanelis Carrillo is a 22yo F here with complaints of CP and dizziness found to have AV Heart Block at OSH. On arrival to  she is vitally stable and without acute complaints. Denies Cardiopulmonary sxs on ROS.     #Heart Block  Found to have 3rd Degree AV Block  - Scheduled for Cardiac MRI tomorrow (r/o amyloidosis/Sarcoidosis)  - Complete Echo tomorrow  - Lupus/Lyme disease Antibodies  - EP Consult for evaluation  - Avoid medication that will prolong the QT  - Consider CT angio pending results.    #Depression  ::Patient takes Escitalopram 20 as a home med  - Cont inpatient      Code Status: Full Code   NOK Father: [phone] (Slade)  F: PRN repletion  E: PRN repletion  N:  Regular Diet  IVFs: PRN  Admission height and weight.  Labs ordered: CBC, BMP, magnesium    Test ordered:   Monitor / trend labs while inpatient.  Replace electrolytes as needed.  Replace K to 4.0 & Mg to 2.0.  Aspiration precautions.  Out of bed with assistance   Fall precautions  Call physician for temperature < 36.5 or >38.0 degrees celsius.  Call physician for pulse <50 or >120.  Call physician for respiratory rate <10 or >22.  Call physician for systolic blood pressure <90 or >170.  Call physician for diastolic blood pressure < 50 or >100.  Call physician for pulse ox <92%.  See orders for further plan of care ordered.     VTE prophylaxis:   Ambulate    Jaydon Roberts MD  [facility]

## 2023-11-14 NOTE — CARE PLAN
Over the shift, the patient did not make progress toward the following goals. Patient will have no episodes of chest pain throughout shift. Patient will have no pain by end of shift. Patient will remain HDS of shift.

## 2023-11-14 NOTE — PROGRESS NOTES
Care Transition  Presented with third degree AV block and chest pain. Single. Primary contact father Slade Carrillo. Significant other Willadina Senthil. Being [facility] to  [facility].                       Vivienne Vieyra RN

## 2023-11-14 NOTE — CARE PLAN
The patient's goals for the shift include      The clinical goals for the shift include to keep heart rate >50    Over the shift, the patient did make progress toward the following goals.

## 2023-11-14 NOTE — Clinical Note
Device settings DDD . Patient here for annual pap & pelvic exam.   Last pap was 3/29/18 which was normal.   HRHPV: Negative.  Pap smear history includes: Has hx of abnormal paps  Patients last menstrual period was:  7/26/21  Periods are normal.    Cycles are 28 days apart.  Last mammogram was 7/15/21 which was normal.   BI-RADS: Cat 2 benign  BSE's:  Does do monthly  Family Hx of CA reviewed and updated.  Allergies: Denies known Latex allergy or symptoms of Latex sensitivity.   Current meds reviewed and updated.    Smoking/ETOH hx:              Social History     Tobacco Use   • Smoking status: Current Every Day Smoker     Packs/day: 0.25     Types: Cigarettes   • Smokeless tobacco: Never Used   • Tobacco comment: socially   Substance Use Topics   • Alcohol use: Yes     Comment: half pint of liquor 3 times a week        Contraception:  Tubal Ligation    Concerns/problems:   1.  Patient reports painful periods. Patient states she has pain the entire cycle. Patient states period lasts 7 days.     Health maintance reviewed and updated as needed.  Pharmacy updated: Yes  [facility]: Active     Health Maintenance: Defer to PCP.    Patient would like communication of their results via:        Cell Phone:   Telephone Information:   Mobile [phone]     Okay to leave a message containing results? Yes      Pap orders placed per MD verbal order.;

## 2023-11-14 NOTE — CARE PLAN
Problem: Pain  Goal: My pain/discomfort is manageable  Outcome: Met     Problem: Safety  Goal: Patient will be injury free during hospitalization  Outcome: Met  Goal: I will remain free of falls  Outcome: Met     Problem: Daily Care  Goal: Daily care needs are met  Outcome: Met     Problem: Psychosocial Needs  Goal: Demonstrates ability to cope with hospitalization/illness  Outcome: Met  Goal: Collaborate with me, my family, and caregiver to identify my specific goals  Outcome: Met   The patient's goals for the shift include      The clinical goals for the shift include to keep heart rate >50  Pt met all goas.  Transsferring to [facility] for higher level of care.

## 2023-11-14 NOTE — Clinical Note
The PACEMAKER, DUAL CHAMBER, BROOKS MRI XT DR - [id number] device was inserted. The leads were placed into the connector and visually verified to be in correct position.

## 2023-11-14 NOTE — H&P
History Of Present Illness  Yanelis Carrillo is a 23 y.o. female presenting to [facility] on 11/13/2023 with PMHx of second degree AV block in 7/2022 (postpartum) (ECHO 7/24/2022 with a EF 60 to 65%-showing patient was in A-fib), anemia, anxiety, and depression who presents with plaints of chest pain and lightheadedness. Patient reports that the episodes started last night, describes it as sharp midsternal, and radiating, and last about 5-10 seconds.  Patient rates it a 4/10 on the pain scale.  Patient denies taking new medications or illicit drugs.  Patient does take Lexapro, but has been on it for 4 years.  Denies any recent fever/chills, headache, dizziness, palpitations, shortness of breath, cough, abdominal pain, N/V/D/C, dysuria, or hematuria.     While in the ED, CMP with hyponatremia at 135.  Renal and liver function WNL.  High-sensitivity troponin 1 negative x2 at 3> 3.  CBC with WBC, hemoglobin, and hematocrit WNL.  Chest x-ray negative.  EKG: Third-degree AV block, ventricular rate 51, NJ *, QRS 84, QTc 100. No ST elevation or depression noted.  ED physician spoke to Dr. Hagan who recommended patient be transferred to [facility] for further evaluation with cardiac MRI and possible diagnostic EP study.  However, no beds available, thus admit inpatient to [facility] with EP consult.     Past Medical History  She has a past medical history of Acute superficial venous thrombosis of lower extremity (11/13/2023), Anemia (11/13/2023), Anxiety and depression (11/13/2023), Gestational hypertension (11/13/2023), Pain in unspecified ankle and joints of unspecified foot, Personal history of other (healed) physical injury and trauma, Personal history of other (healed) physical injury and trauma, Personal history of other diseases of the female genital tract, Personal history of other diseases of the respiratory system, Personal history of other specified conditions, and Second degree AV block (11/13/2023).    Surgical History  She has a past  surgical history that includes  section, low transverse (2022).     Social History  She reports that she has never smoked. She has never been exposed to tobacco smoke. She has never used smokeless tobacco. She reports that she does not drink alcohol and does not use drugs.    Family History  Family History   Problem Relation Name Age of Onset    Other (sinus tachycardia) Mother      Skin cancer Paternal Grandfather          Allergies  Patient has no known allergies.    Review of Systems (Bold words are positive)    Constitutional: NEGATIVE: Fever, Chills, Malaise, Weight Loss     Eyes: NEGATIVE: Blurry Vision, Vision Loss/ Change, Redness, Drainage     ENMT: NEGATIVE: Nasal Discharge, Nasal Congestion, Throat Pain, Mouth Pain, Ear Pain     Respiratory: NEGATIVE: Dry Cough, Productive Cough, Shortness of Breath, Wheezing, Hemoptysis     Cardiac: NEGATIVE: Chest Pain, Dyspnea on Exertion, Palpitations, Orthopnea, Syncope      Gastrointestinal: Negative: Nausea, Vomiting, Diarrhea, Constipation, Abdominal Pain     Genitourinary: NEGATIVE: Dysuria, Frequency, Hematuria, Flank Pain     Musculoskeletal: Negative: Decreased ROM, Pain, Weakness, Swelling     Neurological: NEGATIVE: Confusion, Dizziness, Headache, Syncope, Seizures     Psychiatric: NEGATIVE: Anxiety     Skin: NEGATIVE: Mass, Rash, Pruritus,  Ulcer, Pain     Endocrine: NEGATIVE: Sweat, Thirst, Polydipsia      Hematologic/Lymph: NEGATIVE: Anemia, Bruising, Night Sweats     Allergic/Immunologic: NEGATIVE: Itching, Sneezing        Physical Exam  Constitutional: Awake and alert, Calm, and Cooperative. Speech clear. No acute distress.  Skin: Pink, warm, and dry. No lesions or rashes.  Eyes: PERRL, sclera clear, No swelling or drainage noted  ENMT: Mucous membranes pink and moist, no apparent injury, no lesions seen  Head/Neck: Neck Supple, no apparent injury, trachea midline, no palpable masses on head  Cardiac: Regular rhythm with bradycardia,  "Auscultated S1 & S2, no murmurs  Lungs: CTAB, symmetrical chest rise, no accessory muscle usage, unlabored  Abdomen: Soft, non-tender, no rebound tenderness or guarding, nondistended, positive bowel sounds in all quadrants  Musculoskeletal: ROM intact, no joint swelling, normal strength  Extremities: No edema, No cyanosis, 2+ pulses of the extremities, see skin assessment for wounds  Neurological: Alert and Oriented x 3, intact senses, bilateral hand grasps and foot push/pulls equal.  Psychological: Appropriate mood and behavior.       Last Recorded Vitals  Blood pressure 128/68, pulse 54, temperature 36.8 °C (98.2 °F), resp. rate 20, height 1.676 m (5' 6\"), weight 79.8 kg (176 lb), SpO2 96 %.  Visit Vitals  /68   Pulse 54   Temp 36.8 °C (98.2 °F)   Resp 20   Ht 1.676 m (5' 6\")   Wt 79.8 kg (176 lb)   SpO2 96%   BMI 28.41 kg/m²   Smoking Status Never   BSA 1.93 m²     Weight: 79.8 kg (176 lb)   Pain Assessment: 0-10  Pain Score: 4 (right side of chest, same as arrival)  Pain Location: Chest  Pain Descriptors: Aching        Relevant Results  Results for orders placed or performed during the hospital encounter of 11/13/23 (from the past 24 hour(s))   CBC with Differential   Result Value Ref Range    WBC 5.6 4.4 - 11.3 x10*3/uL    nRBC 0.0 0.0 - 0.0 /100 WBCs    RBC 5.08 4.00 - 5.20 x10*6/uL    Hemoglobin 11.9 (L) 12.0 - 16.0 g/dL    Hematocrit 40.3 36.0 - 46.0 %    MCV 79 (L) 80 - 100 fL    MCH 23.4 (L) 26.0 - 34.0 pg    MCHC 29.5 (L) 32.0 - 36.0 g/dL    RDW 15.1 (H) 11.5 - 14.5 %    Platelets 232 150 - 450 x10*3/uL    Neutrophils % 54.3 40.0 - 80.0 %    Immature Granulocytes %, Automated 0.2 0.0 - 0.9 %    Lymphocytes % 35.3 13.0 - 44.0 %    Monocytes % 7.5 2.0 - 10.0 %    Eosinophils % 2.0 0.0 - 6.0 %    Basophils % 0.7 0.0 - 2.0 %    Neutrophils Absolute 3.06 1.20 - 7.70 x10*3/uL    Immature Granulocytes Absolute, Automated 0.01 0.00 - 0.70 x10*3/uL    Lymphocytes Absolute 1.99 1.20 - 4.80 x10*3/uL    " Monocytes Absolute 0.42 0.10 - 1.00 x10*3/uL    Eosinophils Absolute 0.11 0.00 - 0.70 x10*3/uL    Basophils Absolute 0.04 0.00 - 0.10 x10*3/uL   Comprehensive Metabolic Panel   Result Value Ref Range    Glucose 94 74 - 99 mg/dL    Sodium 135 (L) 136 - 145 mmol/L    Potassium 4.0 3.5 - 5.3 mmol/L    Chloride 103 98 - 107 mmol/L    Bicarbonate 27 21 - 32 mmol/L    Anion Gap 9 (L) 10 - 20 mmol/L    Urea Nitrogen 12 6 - 23 mg/dL    Creatinine 0.81 0.50 - 1.05 mg/dL    eGFR >90 >60 mL/min/1.73m*2    Calcium 9.0 8.6 - 10.3 mg/dL    Albumin 3.5 3.4 - 5.0 g/dL    Alkaline Phosphatase 100 33 - 110 U/L    Total Protein 7.2 6.4 - 8.2 g/dL    AST 17 9 - 39 U/L    Bilirubin, Total 0.3 0.0 - 1.2 mg/dL    ALT 16 7 - 45 U/L   hCG, quantitative, pregnancy   Result Value Ref Range    HCG, Beta-Quantitative <2 <5 mIU/mL   Troponin I, High Sensitivity, Initial   Result Value Ref Range    Troponin I, High Sensitivity 3 0 - 13 ng/L   Troponin, High Sensitivity, 1 Hour   Result Value Ref Range    Troponin I, High Sensitivity 3 0 - 13 ng/L      XR chest 1 view    Result Date: 11/13/2023  STUDY: Chest Radiograph;  11/13/2023, 11:36AM. INDICATION: Chest pain. COMPARISON: None. ACCESSION NUMBER(S): [id number] TECHNIQUE:  Frontal chest was obtained at 11:34 hours. FINDINGS: CARDIOMEDIASTINAL SILHOUETTE: Cardiomediastinal silhouette is normal in size and configuration.  LUNGS: Lungs are clear.  ABDOMEN: No remarkable upper abdominal findings.  BONES: No acute osseous changes.    No regions of airspace consolidation.  No pneumothorax.  Normal cardiac size. Signed by Austin Leavitt MD         Home Medications  Prior to Admission medications    Medication Sig Start Date End Date Taking? Authorizing Provider   escitalopram (Lexapro) 20 mg tablet Take 1 tablet (20 mg) by mouth once daily.  Patient taking differently: Take 1 tablet (20 mg) by mouth once daily at bedtime. 10/5/23   Veronika Ayala MD       Medications  Scheduled  medications  escitalopram, 20 mg, oral, Nightly    Continuous medications  sodium chloride 0.9%, 75 mL/hr    PRN medications  PRN medications: acetaminophen **OR** acetaminophen **OR** acetaminophen, melatonin, polyethylene glycol        Assessment/Plan   Principal Problem:    Third degree heart block (CMS/HCC)  Active Problems:    Second degree AV block    Anxiety and depression    Anemia        Plan:  Code Status: Full Code   Consult: Cardiology--EP.  IVFs: Normal saline at 75 MLS per hour x12 hours.  Meds: Final 650 mg p.o. every 6 hours as needed for pain 1-3, melatonin 3 milligrams p.o. daily as needed for insomnia, MiraLAX 17 gms p.o daily as needed for constipation.  Avoid medication that will prolong the QT.  Diet: Cardiac.  Telemetry monitoring.  Vital signs with BP, HR, & RR Q 4 hours.  Pulse ox Q 4 hours.   Admission height and weight.  Labs ordered: CBC, BMP, magnesium, phosphorus, TSH, T4, hemoglobin A1c, TIBC and iron, ferritin, folate, vitamin B12, stool for occult blood, lipid panel, urine toxicology.  Test ordered: Defer to attending and consults.  Monitor / trend labs while inpatient.  Replace electrolytes as needed.  Replace K to 4.0 & Mg to 2.0.  Aspiration precautions.  Out of bed with assistance   Fall precautions  Call physician for temperature < 36.5 or >38.0 degrees celsius.  Call physician for pulse <50 or >120.  Call physician for respiratory rate <10 or >22.  Call physician for systolic blood pressure <90 or >170.  Call physician for diastolic blood pressure < 50 or >100.  Call physician for pulse ox <92%.  See orders for further plan of care ordered.     VTE prophylaxis:   Ambulate  BLE SCDs.  Monitor for s/s of bleeding    Further evaluation and management per attending and consulting physicians.      This note has been transcribed using Dragon voice recognition system and there is a possibility of unintentional typing misprints.  Any information found to be copied from previous  providers is done in the best interest of the patient to provide accurate, quality, and continuity of care.     I spent 34 minutes in the professional and overall care of this patient.      [facility]

## 2023-11-14 NOTE — PROGRESS NOTES
Pharmacy Medication History Review    Yanelis Carrillo is a 23 y.o. female admitted for Heart block. Pharmacy reviewed the patient's becom-ma-iuujtwtny medications and allergies for accuracy.    The list below reflects the updated PTA list. Comments regarding how patient may be taking medications differently can be found in the Admit Orders Activity  Prior to Admission Medications   Prescriptions Last Dose Informant Patient Reported? Taking?   escitalopram (Lexapro) 20 mg tablet  Self No No   Sig: Take 1 tablet (20 mg) by mouth once daily.   Patient taking differently: Take 1 tablet (20 mg) by mouth once daily at bedtime.      Facility-Administered Medications: None        The list below reflects the updated allergy list. Please review each documented allergy for additional clarification and justification.  Allergies  Reviewed by Katie Galvin [facility] on 11/14/2023        Severity Reactions Comments    Cefdinir High Hives, Anaphylaxis     Metronidazole High Anaphylaxis, Hives, Unknown     Peanut High Anaphylaxis     Cephalexin Not Specified Other             Patient declines M2B at discharge. Pharmacy has been updated to [facility].    Sources used to complete the med history include: Patient interview, OARRS, Care Everywhere, retail fill history.    Below are additional concerns with the patient's PTA list.      Katie Galvin [facility]   Transitions of Care Pharmacist  [facility]  Please reach out via Secure Chat for questions, or if no response call [employer] [place] [facility] or [facility]

## 2023-11-14 NOTE — NURSING NOTE
Report given to physicians ambulance, pt transferred to [facility] [phone] via stretcher.  Report given per nightshift rn.

## 2023-11-14 NOTE — PROGRESS NOTES
ELECTROPHYSIOLOGY NOTE:    The patient was transferred to [facility] this morning before she could be seen by me.  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  She was seen by Dr. Paul Hagan, and was transferred to [facility] for further evaluation with a cardiac MR and lab testing for autoimmune disease.  She will also be assessed by EP there, for consideration of DDDR pacemaker placement (with an LBB-area V lead), particularly if chronotropic incompetence is documented.    Devendra England MD  11/14/2023  10:34

## 2023-11-15 ENCOUNTER — APPOINTMENT (OUTPATIENT)
Dept: CARDIOLOGY | Facility: HOSPITAL | Age: 23
End: 2023-11-15
Payer: COMMERCIAL

## 2023-11-15 ENCOUNTER — APPOINTMENT (OUTPATIENT)
Dept: RADIOLOGY | Facility: HOSPITAL | Age: 23
End: 2023-11-15
Payer: COMMERCIAL

## 2023-11-15 LAB
ALBUMIN SERPL BCP-MCNC: 3.6 G/DL (ref 3.4–5)
ANION GAP SERPL CALC-SCNC: 15 MMOL/L (ref 10–20)
AORTIC VALVE MEAN GRADIENT: 6
AORTIC VALVE PEAK VELOCITY: 1.64
ATRIAL RATE: 74 BPM
AV PEAK GRADIENT: 10.8
AVA (PEAK VEL): 2.43
AVA (VTI): 2.62
BASOPHILS # BLD AUTO: 0.04 X10*3/UL (ref 0–0.1)
BASOPHILS NFR BLD AUTO: 0.7 %
BUN SERPL-MCNC: 14 MG/DL (ref 6–23)
CALCIUM SERPL-MCNC: 9.2 MG/DL (ref 8.6–10.6)
CHLORIDE SERPL-SCNC: 108 MMOL/L (ref 98–107)
CO2 SERPL-SCNC: 22 MMOL/L (ref 21–32)
CREAT SERPL-MCNC: 0.78 MG/DL (ref 0.5–1.05)
EJECTION FRACTION APICAL 4 CHAMBER: 58.9
EJECTION FRACTION: 56
EOSINOPHIL # BLD AUTO: 0.12 X10*3/UL (ref 0–0.7)
EOSINOPHIL NFR BLD AUTO: 2.2 %
ERYTHROCYTE [DISTWIDTH] IN BLOOD BY AUTOMATED COUNT: 15.3 % (ref 11.5–14.5)
GFR SERPL CREATININE-BSD FRML MDRD: >90 ML/MIN/1.73M*2
GLUCOSE SERPL-MCNC: 84 MG/DL (ref 74–99)
HCT VFR BLD AUTO: 40.6 % (ref 36–46)
HGB BLD-MCNC: 12.1 G/DL (ref 12–16)
IMM GRANULOCYTES # BLD AUTO: 0.01 X10*3/UL (ref 0–0.7)
IMM GRANULOCYTES NFR BLD AUTO: 0.2 % (ref 0–0.9)
LEFT ATRIUM VOLUME AREA LENGTH INDEX BSA: 20.6
LEFT VENTRICLE INTERNAL DIMENSION DIASTOLE: 5.1 (ref 3.5–6)
LEFT VENTRICULAR OUTFLOW TRACT DIAMETER: 2
LYMPHOCYTES # BLD AUTO: 1.45 X10*3/UL (ref 1.2–4.8)
LYMPHOCYTES NFR BLD AUTO: 26.3 %
MAGNESIUM SERPL-MCNC: 2.04 MG/DL (ref 1.6–2.4)
MCH RBC QN AUTO: 23.7 PG (ref 26–34)
MCHC RBC AUTO-ENTMCNC: 29.8 G/DL (ref 32–36)
MCV RBC AUTO: 80 FL (ref 80–100)
MITRAL VALVE E/A RATIO: 3.07
MONOCYTES # BLD AUTO: 0.43 X10*3/UL (ref 0.1–1)
MONOCYTES NFR BLD AUTO: 7.8 %
NEUTROPHILS # BLD AUTO: 3.47 X10*3/UL (ref 1.2–7.7)
NEUTROPHILS NFR BLD AUTO: 62.8 %
NRBC BLD-RTO: 0 /100 WBCS (ref 0–0)
P AXIS: 20 DEGREES
PHOSPHATE SERPL-MCNC: 3.9 MG/DL (ref 2.5–4.9)
PLATELET # BLD AUTO: 221 X10*3/UL (ref 150–450)
POTASSIUM SERPL-SCNC: 3.7 MMOL/L (ref 3.5–5.3)
Q ONSET: 222 MS
QRS COUNT: 8 BEATS
QRS DURATION: 84 MS
QT INTERVAL: 470 MS
QTC CALCULATION(BAZETT): 415 MS
QTC FREDERICIA: 433 MS
R AXIS: 97 DEGREES
RBC # BLD AUTO: 5.1 X10*6/UL (ref 4–5.2)
RIGHT VENTRICLE FREE WALL PEAK S': 10.8
SODIUM SERPL-SCNC: 141 MMOL/L (ref 136–145)
T AXIS: 64 DEGREES
T OFFSET: 457 MS
TRICUSPID ANNULAR PLANE SYSTOLIC EXCURSION: 2.8
VENTRICULAR RATE: 47 BPM
WBC # BLD AUTO: 5.5 X10*3/UL (ref 4.4–11.3)

## 2023-11-15 PROCEDURE — 75561 CARDIAC MRI FOR MORPH W/DYE: CPT

## 2023-11-15 PROCEDURE — 93005 ELECTROCARDIOGRAM TRACING: CPT

## 2023-11-15 PROCEDURE — 93306 TTE W/DOPPLER COMPLETE: CPT | Performed by: INTERNAL MEDICINE

## 2023-11-15 PROCEDURE — 75565 CARD MRI VELOC FLOW MAPPING: CPT | Performed by: INTERNAL MEDICINE

## 2023-11-15 PROCEDURE — 86618 LYME DISEASE ANTIBODY: CPT

## 2023-11-15 PROCEDURE — 83735 ASSAY OF MAGNESIUM: CPT

## 2023-11-15 PROCEDURE — 2500000004 HC RX 250 GENERAL PHARMACY W/ HCPCS (ALT 636 FOR OP/ED)

## 2023-11-15 PROCEDURE — 93306 TTE W/DOPPLER COMPLETE: CPT

## 2023-11-15 PROCEDURE — 36415 COLL VENOUS BLD VENIPUNCTURE: CPT

## 2023-11-15 PROCEDURE — 2550000001 HC RX 255 CONTRASTS

## 2023-11-15 PROCEDURE — 87476 LYME DIS DNA AMP PROBE: CPT | Performed by: INTERNAL MEDICINE

## 2023-11-15 PROCEDURE — 99232 SBSQ HOSP IP/OBS MODERATE 35: CPT

## 2023-11-15 PROCEDURE — 86617 LYME DISEASE ANTIBODY: CPT

## 2023-11-15 PROCEDURE — A9575 INJ GADOTERATE MEGLUMI 0.1ML: HCPCS

## 2023-11-15 PROCEDURE — 75561 CARDIAC MRI FOR MORPH W/DYE: CPT | Performed by: INTERNAL MEDICINE

## 2023-11-15 PROCEDURE — 86038 ANTINUCLEAR ANTIBODIES: CPT

## 2023-11-15 PROCEDURE — 85025 COMPLETE CBC W/AUTO DIFF WBC: CPT

## 2023-11-15 PROCEDURE — 2500000001 HC RX 250 WO HCPCS SELF ADMINISTERED DRUGS (ALT 637 FOR MEDICARE OP)

## 2023-11-15 PROCEDURE — 80069 RENAL FUNCTION PANEL: CPT

## 2023-11-15 PROCEDURE — 1200000002 HC GENERAL ROOM WITH TELEMETRY DAILY

## 2023-11-15 RX ORDER — GADOTERATE MEGLUMINE 376.9 MG/ML
30 INJECTION INTRAVENOUS
Status: COMPLETED | OUTPATIENT
Start: 2023-11-15 | End: 2023-11-15

## 2023-11-15 RX ADMIN — PERFLUTREN 2 ML OF DILUTION: 6.52 INJECTION, SUSPENSION INTRAVENOUS at 11:50

## 2023-11-15 RX ADMIN — ESCITALOPRAM OXALATE 20 MG: 20 TABLET ORAL at 20:05

## 2023-11-15 RX ADMIN — ACETAMINOPHEN 650 MG: 325 TABLET ORAL at 19:41

## 2023-11-15 RX ADMIN — ACETAMINOPHEN 650 MG: 325 TABLET ORAL at 12:49

## 2023-11-15 RX ADMIN — GADOTERATE MEGLUMINE 30 ML: 376.9 INJECTION INTRAVENOUS at 09:38

## 2023-11-15 RX ADMIN — ACETAMINOPHEN 650 MG: 325 TABLET ORAL at 04:37

## 2023-11-15 ASSESSMENT — COGNITIVE AND FUNCTIONAL STATUS - GENERAL
MOBILITY SCORE: 24
DAILY ACTIVITIY SCORE: 24
DAILY ACTIVITIY SCORE: 24
MOBILITY SCORE: 24

## 2023-11-15 ASSESSMENT — PAIN SCALES - GENERAL
PAINLEVEL_OUTOF10: 6
PAINLEVEL_OUTOF10: 0 - NO PAIN
PAINLEVEL_OUTOF10: 4

## 2023-11-15 NOTE — PROGRESS NOTES
11/15/23  1146  Transitional Care Coordinator   Attempted to meet with patient however she was off the floor.  Met patient's mom and introduced myself as Care Coordinator and member of the discharge planning team. Patient was admitted with Heart Block. Per kiersten mom patient plans to return to her own home at time of discharge with help from her boyfriend. No home care needs were identified at this time. Care Transitions team will continue to follow for home going needs.   Maria A Gustafson RN

## 2023-11-15 NOTE — PROGRESS NOTES
Yanelis Carrillo is a 23 y.o. female on day 1 of admission presenting with Heart block.    Subjective   Overnight no acute events. Patient reports some SOB with no inciting events. She required no intervention and symptoms resolved on her their own. Denies any CP/N/V/HA/Dizziness overnight or at this time.       Objective     Physical Exam  Constitutional:       Appearance: Normal appearance.   HENT:      Head: Normocephalic and atraumatic.      Mouth/Throat:      Mouth: Mucous membranes are moist.   Eyes:      Extraocular Movements: Extraocular movements intact.      Pupils: Pupils are equal, round, and reactive to light.   Cardiovascular:      Rate and Rhythm: Normal rate and regular rhythm.      Pulses: Normal pulses.      Heart sounds: Normal heart sounds.   Pulmonary:      Effort: Pulmonary effort is normal.      Breath sounds: Normal breath sounds.   Abdominal:      General: Bowel sounds are normal.      Palpations: Abdomen is soft.   Musculoskeletal:         General: Normal range of motion.   Skin:     General: Skin is warm and dry.   Neurological:      General: No focal deficit present.      Mental Status: She is alert and oriented to person, place, and time.   Psychiatric:         Mood and Affect: Mood normal.         Behavior: Behavior normal.         Last Recorded Vitals  Blood pressure 133/80, pulse 54, temperature 36.4 °C (97.5 °F), resp. rate 18, weight 77.1 kg (170 lb), last menstrual period 11/14/2023, SpO2 97 %.  Intake/Output last 3 Shifts:  I/O last 3 completed shifts:  In: 240 (3.1 mL/kg) [P.O.:240]  Out: - (0 mL/kg)   Weight: 77.1 kg     Relevant Results                  Assessment/Plan   Principal Problem:    Heart block    Yanelis Carrillo is a 24yo F here with complaints of CP and dizziness found to have AV Heart Block at OSH. On arrival to  she is vitally stable and without acute complaints. Continues to deny present Cardiopulmonary sxs on ROS.      #Heart Block  Found to have 3rd Degree AV Block  -  Scheduled for Cardiac MRI today (r/o amyloidosis/Sarcoidosis)  - Complete Echo today  - Lupus/Lyme disease Antibodies  - EP Consult for evaluation  - Avoid medication that will prolong the QT  - Consider CT angio pending results.     #Depression  ::Patient takes Escitalopram 20 as a home med  - Cont inpatient        Code Status: Full Code   AUNDREA Father: [phone] ([place])  F: PRN repletion  E: PRN repletion  N: Regular Diet             Jaydon Roberts MD

## 2023-11-16 ENCOUNTER — APPOINTMENT (OUTPATIENT)
Dept: RADIOLOGY | Facility: HOSPITAL | Age: 23
End: 2023-11-16
Payer: COMMERCIAL

## 2023-11-16 DIAGNOSIS — I45.9 HEART BLOCK: Primary | ICD-10-CM

## 2023-11-16 LAB
ALBUMIN SERPL BCP-MCNC: 3.6 G/DL (ref 3.4–5)
ANA SER QL HEP2 SUBST: NEGATIVE
ANION GAP SERPL CALC-SCNC: 14 MMOL/L (ref 10–20)
BASOPHILS # BLD AUTO: 0.04 X10*3/UL (ref 0–0.1)
BASOPHILS NFR BLD AUTO: 0.9 %
BUN SERPL-MCNC: 14 MG/DL (ref 6–23)
CALCIUM SERPL-MCNC: 9.1 MG/DL (ref 8.6–10.6)
CHLORIDE SERPL-SCNC: 107 MMOL/L (ref 98–107)
CO2 SERPL-SCNC: 23 MMOL/L (ref 21–32)
CREAT SERPL-MCNC: 0.74 MG/DL (ref 0.5–1.05)
DRVVT SCREEN TO CONFIRM RATIO: 0.92 RATIO
DRVVT/DRVVT CFM NRMLZD PPP-RTO: 1 RATIO
DRVVT/DRVVT CFM P DOAC NEUT NORM PPP-RTO: 0.92 RATIO
EOSINOPHIL # BLD AUTO: 0.14 X10*3/UL (ref 0–0.7)
EOSINOPHIL NFR BLD AUTO: 3.2 %
ERYTHROCYTE [DISTWIDTH] IN BLOOD BY AUTOMATED COUNT: 15.4 % (ref 11.5–14.5)
GFR SERPL CREATININE-BSD FRML MDRD: >90 ML/MIN/1.73M*2
GLUCOSE SERPL-MCNC: 98 MG/DL (ref 74–99)
HCT VFR BLD AUTO: 43.4 % (ref 36–46)
HGB BLD-MCNC: 12.9 G/DL (ref 12–16)
IMM GRANULOCYTES # BLD AUTO: 0.01 X10*3/UL (ref 0–0.7)
IMM GRANULOCYTES NFR BLD AUTO: 0.2 % (ref 0–0.9)
LA 2 SCREEN W REFLEX-IMP: NORMAL
LYMPHOCYTES # BLD AUTO: 1.38 X10*3/UL (ref 1.2–4.8)
LYMPHOCYTES NFR BLD AUTO: 31.2 %
MAGNESIUM SERPL-MCNC: 2.08 MG/DL (ref 1.6–2.4)
MCH RBC QN AUTO: 23.7 PG (ref 26–34)
MCHC RBC AUTO-ENTMCNC: 29.7 G/DL (ref 32–36)
MCV RBC AUTO: 80 FL (ref 80–100)
MONOCYTES # BLD AUTO: 0.35 X10*3/UL (ref 0.1–1)
MONOCYTES NFR BLD AUTO: 7.9 %
NEUTROPHILS # BLD AUTO: 2.5 X10*3/UL (ref 1.2–7.7)
NEUTROPHILS NFR BLD AUTO: 56.6 %
NORMALIZED SCT PPP-RTO: 1.05 RATIO
NRBC BLD-RTO: 0 /100 WBCS (ref 0–0)
PHOSPHATE SERPL-MCNC: 4.2 MG/DL (ref 2.5–4.9)
PLATELET # BLD AUTO: 214 X10*3/UL (ref 150–450)
POTASSIUM SERPL-SCNC: 4 MMOL/L (ref 3.5–5.3)
RBC # BLD AUTO: 5.45 X10*6/UL (ref 4–5.2)
SILICA CLOTTING TIME CONFIRMATION: 0.87 RATIO
SILICA CLOTTING TIME SCREEN: 0.91 RATIO
SODIUM SERPL-SCNC: 140 MMOL/L (ref 136–145)
WBC # BLD AUTO: 4.4 X10*3/UL (ref 4.4–11.3)

## 2023-11-16 PROCEDURE — 2780000003 HC OR 278 NO HCPCS: Performed by: INTERNAL MEDICINE

## 2023-11-16 PROCEDURE — 36415 COLL VENOUS BLD VENIPUNCTURE: CPT

## 2023-11-16 PROCEDURE — 80069 RENAL FUNCTION PANEL: CPT

## 2023-11-16 PROCEDURE — C1894 INTRO/SHEATH, NON-LASER: HCPCS | Performed by: INTERNAL MEDICINE

## 2023-11-16 PROCEDURE — 99153 MOD SED SAME PHYS/QHP EA: CPT | Performed by: INTERNAL MEDICINE

## 2023-11-16 PROCEDURE — 99152 MOD SED SAME PHYS/QHP 5/>YRS: CPT | Performed by: INTERNAL MEDICINE

## 2023-11-16 PROCEDURE — 1200000002 HC GENERAL ROOM WITH TELEMETRY DAILY

## 2023-11-16 PROCEDURE — C1898 LEAD, PMKR, OTHER THAN TRANS: HCPCS | Performed by: INTERNAL MEDICINE

## 2023-11-16 PROCEDURE — 93010 ELECTROCARDIOGRAM REPORT: CPT | Performed by: INTERNAL MEDICINE

## 2023-11-16 PROCEDURE — 33208 INSRT HEART PM ATRIAL & VENT: CPT | Mod: KX | Performed by: INTERNAL MEDICINE

## 2023-11-16 PROCEDURE — 2500000004 HC RX 250 GENERAL PHARMACY W/ HCPCS (ALT 636 FOR OP/ED)

## 2023-11-16 PROCEDURE — C1892 INTRO/SHEATH,FIXED,PEEL-AWAY: HCPCS | Performed by: INTERNAL MEDICINE

## 2023-11-16 PROCEDURE — 71045 X-RAY EXAM CHEST 1 VIEW: CPT

## 2023-11-16 PROCEDURE — 99233 SBSQ HOSP IP/OBS HIGH 50: CPT

## 2023-11-16 PROCEDURE — 02HK3JZ INSERTION OF PACEMAKER LEAD INTO RIGHT VENTRICLE, PERCUTANEOUS APPROACH: ICD-10-PCS | Performed by: INTERNAL MEDICINE

## 2023-11-16 PROCEDURE — 2500000001 HC RX 250 WO HCPCS SELF ADMINISTERED DRUGS (ALT 637 FOR MEDICARE OP)

## 2023-11-16 PROCEDURE — 02H63JZ INSERTION OF PACEMAKER LEAD INTO RIGHT ATRIUM, PERCUTANEOUS APPROACH: ICD-10-PCS | Performed by: INTERNAL MEDICINE

## 2023-11-16 PROCEDURE — 0JH606Z INSERTION OF PACEMAKER, DUAL CHAMBER INTO CHEST SUBCUTANEOUS TISSUE AND FASCIA, OPEN APPROACH: ICD-10-PCS | Performed by: INTERNAL MEDICINE

## 2023-11-16 PROCEDURE — 2720000007 HC OR 272 NO HCPCS: Performed by: INTERNAL MEDICINE

## 2023-11-16 PROCEDURE — 85025 COMPLETE CBC W/AUTO DIFF WBC: CPT

## 2023-11-16 PROCEDURE — 2550000001 HC RX 255 CONTRASTS: Performed by: INTERNAL MEDICINE

## 2023-11-16 PROCEDURE — C1785 PMKR, DUAL, RATE-RESP: HCPCS | Performed by: INTERNAL MEDICINE

## 2023-11-16 PROCEDURE — 83735 ASSAY OF MAGNESIUM: CPT

## 2023-11-16 PROCEDURE — C1887 CATHETER, GUIDING: HCPCS | Performed by: INTERNAL MEDICINE

## 2023-11-16 PROCEDURE — 2750000001 HC OR 275 NO HCPCS: Performed by: INTERNAL MEDICINE

## 2023-11-16 PROCEDURE — 2500000004 HC RX 250 GENERAL PHARMACY W/ HCPCS (ALT 636 FOR OP/ED): Performed by: INTERNAL MEDICINE

## 2023-11-16 PROCEDURE — 33208 INSRT HEART PM ATRIAL & VENT: CPT | Performed by: INTERNAL MEDICINE

## 2023-11-16 PROCEDURE — 71045 X-RAY EXAM CHEST 1 VIEW: CPT | Performed by: RADIOLOGY

## 2023-11-16 DEVICE — IPG W1DR01 AZURE XT DR MRI WL USA BCP
Type: IMPLANTABLE DEVICE | Site: CHEST  WALL | Status: FUNCTIONAL
Brand: AZURE™ XT DR MRI SURESCAN™

## 2023-11-16 DEVICE — LEAD 5076-52 CAPSUREFIX NOVUS US EN
Type: IMPLANTABLE DEVICE | Site: CHEST  WALL | Status: FUNCTIONAL
Brand: CAPSUREFIX® NOVUS

## 2023-11-16 DEVICE — LEAD 383069 SELECT SECURE US EN FPA
Type: IMPLANTABLE DEVICE | Site: CHEST  WALL | Status: FUNCTIONAL
Brand: SELECTSECURE™

## 2023-11-16 DEVICE — SLITTER, ADJUSTABLE: Type: IMPLANTABLE DEVICE | Site: CHEST  WALL | Status: FUNCTIONAL

## 2023-11-16 RX ORDER — BUPIVACAINE HYDROCHLORIDE 2.5 MG/ML
INJECTION, SOLUTION EPIDURAL; INFILTRATION; INTRACAUDAL AS NEEDED
Status: DISCONTINUED | OUTPATIENT
Start: 2023-11-16 | End: 2023-11-16 | Stop reason: HOSPADM

## 2023-11-16 RX ORDER — DOXYCYCLINE HYCLATE 100 MG
100 TABLET ORAL EVERY 12 HOURS SCHEDULED
Status: DISCONTINUED | OUTPATIENT
Start: 2023-11-16 | End: 2023-11-16

## 2023-11-16 RX ORDER — FENTANYL CITRATE 50 UG/ML
INJECTION, SOLUTION INTRAMUSCULAR; INTRAVENOUS AS NEEDED
Status: DISCONTINUED | OUTPATIENT
Start: 2023-11-16 | End: 2023-11-16 | Stop reason: HOSPADM

## 2023-11-16 RX ORDER — KETOROLAC TROMETHAMINE 30 MG/ML
30 INJECTION, SOLUTION INTRAMUSCULAR; INTRAVENOUS EVERY 6 HOURS PRN
Status: DISCONTINUED | OUTPATIENT
Start: 2023-11-16 | End: 2023-11-17 | Stop reason: HOSPADM

## 2023-11-16 RX ORDER — SODIUM CHLORIDE 9 MG/ML
INJECTION, SOLUTION INTRAVENOUS CONTINUOUS PRN
Status: COMPLETED | OUTPATIENT
Start: 2023-11-16 | End: 2023-11-16

## 2023-11-16 RX ORDER — ACETAMINOPHEN 325 MG/1
975 TABLET ORAL EVERY 6 HOURS PRN
Status: DISCONTINUED | OUTPATIENT
Start: 2023-11-16 | End: 2023-11-17 | Stop reason: HOSPADM

## 2023-11-16 RX ORDER — VANCOMYCIN HYDROCHLORIDE 1 G/200ML
INJECTION, SOLUTION INTRAVENOUS CONTINUOUS PRN
Status: COMPLETED | OUTPATIENT
Start: 2023-11-16 | End: 2023-11-16

## 2023-11-16 RX ORDER — DOXYCYCLINE HYCLATE 100 MG
100 TABLET ORAL EVERY 12 HOURS SCHEDULED
Status: DISCONTINUED | OUTPATIENT
Start: 2023-11-17 | End: 2023-11-17 | Stop reason: HOSPADM

## 2023-11-16 RX ORDER — MIDAZOLAM HYDROCHLORIDE 1 MG/ML
INJECTION INTRAMUSCULAR; INTRAVENOUS AS NEEDED
Status: DISCONTINUED | OUTPATIENT
Start: 2023-11-16 | End: 2023-11-16 | Stop reason: HOSPADM

## 2023-11-16 RX ORDER — DIPHENHYDRAMINE HYDROCHLORIDE 50 MG/ML
INJECTION INTRAMUSCULAR; INTRAVENOUS AS NEEDED
Status: DISCONTINUED | OUTPATIENT
Start: 2023-11-16 | End: 2023-11-16 | Stop reason: HOSPADM

## 2023-11-16 RX ORDER — ESCITALOPRAM OXALATE 20 MG/1
20 TABLET ORAL DAILY
Status: CANCELLED | OUTPATIENT
Start: 2023-11-16

## 2023-11-16 RX ORDER — VANCOMYCIN HYDROCHLORIDE 500 MG/100ML
INJECTION, SOLUTION INTRAVENOUS CONTINUOUS PRN
Status: COMPLETED | OUTPATIENT
Start: 2023-11-16 | End: 2023-11-16

## 2023-11-16 RX ADMIN — KETOROLAC TROMETHAMINE 30 MG: 30 INJECTION, SOLUTION INTRAMUSCULAR; INTRAVENOUS at 15:46

## 2023-11-16 RX ADMIN — ACETAMINOPHEN 650 MG: 325 TABLET ORAL at 07:54

## 2023-11-16 RX ADMIN — ACETAMINOPHEN 975 MG: 325 TABLET ORAL at 20:58

## 2023-11-16 RX ADMIN — ESCITALOPRAM OXALATE 20 MG: 20 TABLET ORAL at 20:16

## 2023-11-16 ASSESSMENT — PAIN SCALES - GENERAL
PAINLEVEL_OUTOF10: 5 - MODERATE PAIN
PAINLEVEL_OUTOF10: 2
PAINLEVEL_OUTOF10: 4
PAINLEVEL_OUTOF10: 3
PAINLEVEL_OUTOF10: 3

## 2023-11-16 ASSESSMENT — PAIN - FUNCTIONAL ASSESSMENT
PAIN_FUNCTIONAL_ASSESSMENT: 0-10

## 2023-11-16 ASSESSMENT — COGNITIVE AND FUNCTIONAL STATUS - GENERAL
MOBILITY SCORE: 24
DAILY ACTIVITIY SCORE: 24

## 2023-11-16 ASSESSMENT — PAIN DESCRIPTION - LOCATION: LOCATION: CHEST

## 2023-11-16 ASSESSMENT — PAIN DESCRIPTION - ORIENTATION: ORIENTATION: LEFT

## 2023-11-16 NOTE — CARE PLAN
Problem: Safety  Goal: Patient will be injury free during hospitalization  Outcome: Progressing  Goal: I will remain free of falls  Outcome: Progressing   The patient's goals for the shift include      The clinical goals for the shift include

## 2023-11-16 NOTE — PROGRESS NOTES
Yanelis Carrillo is a 23 y.o. female on day 2 of admission presenting with Heart block.    Subjective   Overnight no acute events. The patient required no intervention and symptoms resolved on her their own.    Objective     Physical Exam  Constitutional:       Appearance: Normal appearance.   HENT:      Head: Normocephalic and atraumatic.      Mouth/Throat:      Mouth: Mucous membranes are moist.   Eyes:      Extraocular Movements: Extraocular movements intact.      Pupils: Pupils are equal, round, and reactive to light.   Cardiovascular:      Rate and Rhythm: Normal rate and regular rhythm.      Pulses: Normal pulses.      Heart sounds: Normal heart sounds.   Pulmonary:      Effort: Pulmonary effort is normal.      Breath sounds: Normal breath sounds.   Abdominal:      General: Bowel sounds are normal.      Palpations: Abdomen is soft.   Musculoskeletal:         General: Normal range of motion.   Skin:     General: Skin is warm and dry.   Neurological:      General: No focal deficit present.      Mental Status: She is alert and oriented to person, place, and time.   Psychiatric:         Mood and Affect: Mood normal.         Behavior: Behavior normal.       Last Recorded Vitals  Blood pressure 142/78, pulse 50, temperature 36.6 °C (97.9 °F), resp. rate 15, weight 77.1 kg (170 lb), last menstrual period 11/14/2023, SpO2 100 %.  Intake/Output last 3 Shifts:  I/O last 3 completed shifts:  In: 240 (3.1 mL/kg) [P.O.:240]  Out: - (0 mL/kg)   Weight: 77.1 kg     Relevant Results  Scheduled medications  escitalopram, 20 mg, oral, Daily  polyethylene glycol, 17 g, oral, Daily      Continuous medications     PRN medications  PRN medications: acetaminophen **OR** acetaminophen **OR** acetaminophen        Assessment/Plan   Principal Problem:    Heart block    Yanelis Carrillo is a 22yo F here with complaints of CP and dizziness found to have AV Heart Block at OSH. On arrival to  she is vitally stable and without acute complaints.  Continues to deny present Cardiopulmonary sxs on ROS.        UPDATES 11/16:  - Cardiac MRI with no obvious structural/morphological pathology  - PPM today; pt NPO. Standard Cxr following placement  - Lateral 2 view CXR tomorrow 9am    #Heart Block  Found to have 3rd Degree AV Block  - Scheduled for Cardiac MRI today (r/o amyloidosis/Sarcoidosis)  - Complete Echo today  - Lupus/Lyme disease Antibodies  - Avoid medication that will prolong the QT  - PPM per EP 11/16     #Depression  ::Patient takes Escitalopram 20 as a home med  - Cont inpatient    #Constipation  - Miralax scheduled        Code Status: Full Code   NOK Father: [phone] ([place])  F: PRN repletion  E: PRN repletion  N: Regular Diet             Jaydon Roberts MD

## 2023-11-16 NOTE — CARE PLAN
The patient's goals for the shift include  get some rest    The clinical goals for the shift include pt will not be lightheaded/dizzy through end of shift      Problem: Pain  Goal: My pain/discomfort is manageable  Outcome: Progressing     Problem: Safety  Goal: Patient will be injury free during hospitalization  Outcome: Progressing  Goal: I will remain free of falls  Outcome: Progressing     Problem: Daily Care  Goal: Daily care needs are met  Outcome: Progressing

## 2023-11-17 ENCOUNTER — APPOINTMENT (OUTPATIENT)
Dept: CARDIOLOGY | Facility: HOSPITAL | Age: 23
End: 2023-11-17
Payer: COMMERCIAL

## 2023-11-17 ENCOUNTER — APPOINTMENT (OUTPATIENT)
Dept: RADIOLOGY | Facility: HOSPITAL | Age: 23
End: 2023-11-17
Payer: COMMERCIAL

## 2023-11-17 VITALS
SYSTOLIC BLOOD PRESSURE: 114 MMHG | WEIGHT: 170 LBS | TEMPERATURE: 97.2 F | RESPIRATION RATE: 18 BRPM | OXYGEN SATURATION: 96 % | BODY MASS INDEX: 27.65 KG/M2 | HEART RATE: 71 BPM | DIASTOLIC BLOOD PRESSURE: 63 MMHG

## 2023-11-17 LAB
ATRIAL RATE: 66 BPM
P AXIS: 22 DEGREES
P OFFSET: 171 MS
P ONSET: 130 MS
PR INTERVAL: 198 MS
Q ONSET: 229 MS
QRS COUNT: 11 BEATS
QRS DURATION: 152 MS
QT INTERVAL: 454 MS
QTC CALCULATION(BAZETT): 475 MS
QTC FREDERICIA: 469 MS
R AXIS: 133 DEGREES
T AXIS: 15 DEGREES
T OFFSET: 456 MS
VENTRICULAR RATE: 66 BPM

## 2023-11-17 PROCEDURE — 93005 ELECTROCARDIOGRAM TRACING: CPT

## 2023-11-17 PROCEDURE — 2500000004 HC RX 250 GENERAL PHARMACY W/ HCPCS (ALT 636 FOR OP/ED)

## 2023-11-17 PROCEDURE — 99239 HOSP IP/OBS DSCHRG MGMT >30: CPT

## 2023-11-17 PROCEDURE — 71046 X-RAY EXAM CHEST 2 VIEWS: CPT

## 2023-11-17 PROCEDURE — 2500000001 HC RX 250 WO HCPCS SELF ADMINISTERED DRUGS (ALT 637 FOR MEDICARE OP): Performed by: INTERNAL MEDICINE

## 2023-11-17 PROCEDURE — 71046 X-RAY EXAM CHEST 2 VIEWS: CPT | Performed by: RADIOLOGY

## 2023-11-17 RX ORDER — DOXYCYCLINE 100 MG/1
100 CAPSULE ORAL 2 TIMES DAILY
Qty: 14 CAPSULE | Refills: 0 | Status: SHIPPED | OUTPATIENT
Start: 2023-11-17 | End: 2023-11-24

## 2023-11-17 RX ADMIN — DOXYCYCLINE HYCLATE 100 MG: 100 TABLET, FILM COATED ORAL at 08:46

## 2023-11-17 RX ADMIN — KETOROLAC TROMETHAMINE 30 MG: 30 INJECTION, SOLUTION INTRAMUSCULAR; INTRAVENOUS at 07:06

## 2023-11-17 ASSESSMENT — PAIN SCALES - GENERAL: PAINLEVEL_OUTOF10: 7

## 2023-11-17 ASSESSMENT — PAIN - FUNCTIONAL ASSESSMENT: PAIN_FUNCTIONAL_ASSESSMENT: 0-10

## 2023-11-17 NOTE — HOSPITAL COURSE
Yanelis Carrillo is a 23 y.o. female presenting to [facility] on 11/13/2023 with PMHx significant for 2nd degree AV block in 7/2022 (postpartum) (ECHO 7/24/2022 with a EF 60 to 65%-showing patient was in A-fib), anemia, anxiety, and depression. The patient presents to after transfer from OSH where she had with complaints of chest pain and lightheadedness. Patient reports that the episodes started last night, describes it as sharp midsternal, and radiating, and last about 5-10 seconds intermittently with no clear causal factors.  Patient continues to rate pain 4/10 on the pain scale.  Patient denies taking new medications or illicit drugs.  During interview the patient denies any recent fever/chills, headache, dizziness, palpitations, shortness of breath, cough, abdominal pain, N/V/D/C, dysuria, or hematuria. She reports that she is still weaning of lactation.     While in the ED at OSH, renal and liver function WNL.  High-sensitivity troponin 1 negative x2 at 3> 3.  CBC with WBC, hemoglobin, and hematocrit WNL.  Chest x-ray negative.  EKG: Third-degree AV block, ventricular rate 51, WI *, QRS 84, QTc 100. No ST elevation or depression noted.  ED physician spoke to Dr. Hagan who recommended patient be transferred to [facility] [place] further evaluation with cardiac MRI and possible diagnostic EP study.      Upon transfer to floor the patient was stable and remained on room air. After evaluation pacemaker placement was deemed the best intervention and placed during this admission with no complications pre/intra/post-Op. Patient discharged with 1 week of Doxycycline and appropriate follow up.

## 2023-11-17 NOTE — DISCHARGE SUMMARY
Discharge Diagnosis  Heart block    Issues Requiring Follow-Up  - Must follow up with Cardiac Electrophysiology Dr. Martin  - Must follow up with General Cardiology    Test Results Pending At Discharge  Pending Labs       Order Current Status    Lyme AB Modified 2-Tier Testing, 1st  process    Lyme disease, PCR In process            Hospital Course  Yanelis Carrillo is a 23 y.o. female presenting to [facility] on 11/13/2023 with PMHx significant for 2nd degree AV block in 7/2022 (postpartum) (ECHO 7/24/2022 with a EF 60 to 65%-showing patient was in A-fib), anemia, anxiety, and depression. The patient presents to after transfer from OSH where she had with complaints of chest pain and lightheadedness. Patient reports that the episodes started last night, describes it as sharp midsternal, and radiating, and last about 5-10 seconds intermittently with no clear causal factors.  Patient continues to rate pain 4/10 on the pain scale.  Patient denies taking new medications or illicit drugs.  During interview the patient denies any recent fever/chills, headache, dizziness, palpitations, shortness of breath, cough, abdominal pain, N/V/D/C, dysuria, or hematuria. She reports that she is still weaning of lactation.     While in the ED at OSH, renal and liver function WNL.  High-sensitivity troponin 1 negative x2 at 3> 3.  CBC with WBC, hemoglobin, and hematocrit WNL.  Chest x-ray negative.  EKG: Third-degree AV block, ventricular rate 51, AR *, QRS 84, QTc 100. No ST elevation or depression noted.  ED physician spoke to Dr. Hagan who recommended patient be transferred [place] [facility] [place] further evaluation with cardiac MRI and possible diagnostic EP study.      Upon transfer to floor the patient was stable and remained on room air. After evaluation pacemaker placement was deemed the best intervention and placed during this admission with no complications pre/intra/post-Op. Patient discharged with 1 week of Doxycycline and appropriate  follow up.    Pertinent Physical Exam At Time of Discharge  Physical Exam  Constitutional:       Appearance: Normal appearance.   HENT:      Head: Normocephalic and atraumatic.      Mouth/Throat:      Mouth: Mucous membranes are moist.   Eyes:      Extraocular Movements: Extraocular movements intact.      Pupils: Pupils are equal, round, and reactive to light.   Cardiovascular:      Rate and Rhythm: Normal rate and regular rhythm.      Pulses: Normal pulses.      Heart sounds: Normal heart sounds.   Pulmonary:      Effort: Pulmonary effort is normal.      Breath sounds: Normal breath sounds.   Abdominal:      General: Bowel sounds are normal.      Palpations: Abdomen is soft.   Skin:     General: Skin is warm and dry.      Comments: PPM site is clean, dry, intact, and without purulence.   Neurological:      General: No focal deficit present.      Mental Status: She is alert and oriented to person, place, and time.   Psychiatric:         Mood and Affect: Mood normal.         Behavior: Behavior normal.         Home Medications     Medication List      START taking these medications     doxycycline 100 mg capsule; Commonly known as: Vibramycin; Take 1   capsule (100 mg) by mouth 2 times a day for 7 days. Take with at least 8   ounces (large glass) of water, do not lie down for 30 minutes after     CHANGE how you take these medications     escitalopram 20 mg tablet; Commonly known as: Lexapro; Take 1 tablet (20   mg) by mouth once daily.; What changed: when to take this       Outpatient Follow-Up  Future Appointments   Date Time Provider Department Center   12/26/2023 10:40 AM Debra Martin MD [id number] [place]       Jaydon Roberts MD

## 2023-11-17 NOTE — CARE PLAN
The patient's goals for the shift include      The clinical goals for the shift include pt will sleep at least 4 hours      Problem: Pain  Goal: My pain/discomfort is manageable  Outcome: Progressing     Problem: Safety  Goal: Patient will be injury free during hospitalization  Outcome: Progressing  Goal: I will remain free of falls  Outcome: Progressing     Problem: Daily Care  Goal: Daily care needs are met  Outcome: Progressing

## 2023-11-17 NOTE — DISCHARGE INSTRUCTIONS
Dear Ms. Gerardo, you were initially admitted for chest pain and dizziness then found to have Atrioventricular Heart Block. On further work up you were diagnosized with Third Degree Heart Block. While here you received a permanent pacemaker for control of your heart rhythms with no complication and have otherwise remained vitally stable throughout your hospitalization.     Please Keep all follow-up appointments.  Any chest pain or SOB please report to Emergency Department.    Care Instructions regarding recent Pacemaker placement:    Please refrain from heavy lift, particularly on the left side.  Please keep avoid lifting your left arm above 90 degrees.  Please keep the area clean and dry. Avoid submersion in bathtub, pool, etc.  Please do not use any emollients or lotions on the area.  Follow above instructions at least until follow-up with Cardiology.    You have follow up appointments with    General Cardiology (Dr. Hagan)   Cardiac Electrophysiology (Dr. Martin)  Device Appointment on 12/8/23    If you do not receive a call to schedule the above appointment(s) within one week place call the following number to make an appointment:  [phone]

## 2023-11-19 LAB
B BURGDOR DNA SPEC QL NAA+PROBE: NOT DETECTED
SPECIMEN SOURCE: NORMAL

## 2023-11-20 ENCOUNTER — HOSPITAL ENCOUNTER (OUTPATIENT)
Dept: CARDIOLOGY | Facility: HOSPITAL | Age: 23
Discharge: HOME | End: 2023-11-20
Payer: COMMERCIAL

## 2023-11-20 LAB
ATRIAL RATE: 66 BPM
P AXIS: 41 DEGREES
Q ONSET: 222 MS
QRS COUNT: 7 BEATS
QRS DURATION: 82 MS
QT INTERVAL: 438 MS
QTC CALCULATION(BAZETT): 383 MS
QTC FREDERICIA: 400 MS
R AXIS: 94 DEGREES
T AXIS: 64 DEGREES
T OFFSET: 441 MS
VENTRICULAR RATE: 46 BPM

## 2023-11-20 PROCEDURE — 93005 ELECTROCARDIOGRAM TRACING: CPT

## 2023-11-21 LAB
B BURGDOR IGG SERPL QL IA: 0.26 IV
B BURGDOR IGG+IGM SER IA-IMP: NEGATIVE
B BURGDOR IGM SERPL QL IA: 0.55 IV
B BURGDOR.VLSE1+PEPC10 AB SER IA-ACNC: 1.05 IV

## 2023-11-28 ENCOUNTER — HOSPITAL ENCOUNTER (OUTPATIENT)
Dept: CARDIOLOGY | Facility: HOSPITAL | Age: 23
Discharge: HOME | End: 2023-11-28
Payer: COMMERCIAL

## 2023-11-28 LAB
ATRIAL RATE: 47 BPM
Q ONSET: 224 MS
QRS COUNT: 9 BEATS
QRS DURATION: 84 MS
QT INTERVAL: 434 MS
QTC CALCULATION(BAZETT): 400 MS
QTC FREDERICIA: 411 MS
R AXIS: -29 DEGREES
T AXIS: 18 DEGREES
T OFFSET: 441 MS
VENTRICULAR RATE: 51 BPM

## 2023-11-28 PROCEDURE — 93005 ELECTROCARDIOGRAM TRACING: CPT

## 2023-12-04 ENCOUNTER — TELEPHONE (OUTPATIENT)
Dept: CARDIOLOGY | Facility: HOSPITAL | Age: 23
End: 2023-12-04
Payer: COMMERCIAL

## 2023-12-04 ENCOUNTER — HOSPITAL ENCOUNTER (OUTPATIENT)
Dept: RADIOLOGY | Facility: HOSPITAL | Age: 23
Discharge: HOME | End: 2023-12-04
Payer: COMMERCIAL

## 2023-12-04 DIAGNOSIS — Z95.0 PACEMAKER: ICD-10-CM

## 2023-12-04 DIAGNOSIS — Z95.0 PACEMAKER: Primary | ICD-10-CM

## 2023-12-04 PROCEDURE — 71046 X-RAY EXAM CHEST 2 VIEWS: CPT | Performed by: RADIOLOGY

## 2023-12-04 PROCEDURE — 71046 X-RAY EXAM CHEST 2 VIEWS: CPT

## 2023-12-04 NOTE — TELEPHONE ENCOUNTER
Patient reporting productive cough after pacemaker. Will order CXR to make sure no issues with device.

## 2023-12-05 ENCOUNTER — TELEPHONE (OUTPATIENT)
Dept: CARDIOLOGY | Facility: HOSPITAL | Age: 23
End: 2023-12-05
Payer: COMMERCIAL

## 2023-12-05 ENCOUNTER — HOSPITAL ENCOUNTER (OUTPATIENT)
Dept: CARDIOLOGY | Facility: CLINIC | Age: 23
Discharge: HOME | End: 2023-12-05
Payer: COMMERCIAL

## 2023-12-05 DIAGNOSIS — I44.2 ATRIOVENTRICULAR BLOCK, COMPLETE (MULTI): ICD-10-CM

## 2023-12-05 DIAGNOSIS — I45.9 HEART BLOCK: Primary | ICD-10-CM

## 2023-12-05 DIAGNOSIS — Z95.0 CARDIAC PACEMAKER IN SITU: ICD-10-CM

## 2023-12-05 NOTE — TELEPHONE ENCOUNTER
CXR reviewed, atrial lead has slightly pulled back, but no significant issues. Remote interrogation is reassuring. Will ask for an expedited device check as well as 12-lead ECG

## 2023-12-06 ENCOUNTER — HOSPITAL ENCOUNTER (OUTPATIENT)
Dept: CARDIOLOGY | Facility: HOSPITAL | Age: 23
Discharge: HOME | End: 2023-12-06
Payer: COMMERCIAL

## 2023-12-06 DIAGNOSIS — Z95.0 PACEMAKER: Primary | ICD-10-CM

## 2023-12-06 DIAGNOSIS — I49.5 SICK SINUS SYNDROME DUE TO SA NODE DYSFUNCTION (MULTI): ICD-10-CM

## 2023-12-06 DIAGNOSIS — I45.9 HEART BLOCK: ICD-10-CM

## 2023-12-06 PROCEDURE — 93010 ELECTROCARDIOGRAM REPORT: CPT | Performed by: INTERNAL MEDICINE

## 2023-12-06 PROCEDURE — 93005 ELECTROCARDIOGRAM TRACING: CPT

## 2023-12-06 PROCEDURE — 93280 PM DEVICE PROGR EVAL DUAL: CPT

## 2023-12-06 PROCEDURE — 93280 PM DEVICE PROGR EVAL DUAL: CPT | Performed by: INTERNAL MEDICINE

## 2023-12-06 PROCEDURE — 93290 INTERROG DEV EVAL ICPMS IP: CPT | Performed by: INTERNAL MEDICINE

## 2023-12-07 LAB
ATRIAL RATE: 85 BPM
P AXIS: 36 DEGREES
P OFFSET: 189 MS
P ONSET: 151 MS
PR INTERVAL: 188 MS
Q ONSET: 245 MS
QRS COUNT: 14 BEATS
QRS DURATION: 138 MS
QT INTERVAL: 416 MS
QTC CALCULATION(BAZETT): 495 MS
QTC FREDERICIA: 467 MS
R AXIS: 139 DEGREES
T AXIS: 28 DEGREES
T OFFSET: 453 MS
VENTRICULAR RATE: 85 BPM

## 2023-12-18 ENCOUNTER — TELEPHONE (OUTPATIENT)
Dept: PRIMARY CARE | Facility: CLINIC | Age: 23
End: 2023-12-18
Payer: COMMERCIAL

## 2023-12-18 DIAGNOSIS — L50.9 URTICARIA: Primary | ICD-10-CM

## 2023-12-18 NOTE — TELEPHONE ENCOUNTER
Pt called asking if you can send her in a dermatology referral.  She is having some hives that she is not sure of.  She has switched her laundry detergent and is still having them.    Should she come in for an apt?

## 2023-12-26 ENCOUNTER — OFFICE VISIT (OUTPATIENT)
Dept: CARDIOLOGY | Facility: CLINIC | Age: 23
End: 2023-12-26
Payer: COMMERCIAL

## 2023-12-26 VITALS
OXYGEN SATURATION: 98 % | RESPIRATION RATE: 16 BRPM | SYSTOLIC BLOOD PRESSURE: 112 MMHG | WEIGHT: 178 LBS | BODY MASS INDEX: 28.61 KG/M2 | TEMPERATURE: 97.5 F | DIASTOLIC BLOOD PRESSURE: 57 MMHG | HEART RATE: 88 BPM | HEIGHT: 66 IN

## 2023-12-26 DIAGNOSIS — I44.2 THIRD DEGREE HEART BLOCK (MULTI): Primary | ICD-10-CM

## 2023-12-26 PROCEDURE — 93010 ELECTROCARDIOGRAM REPORT: CPT | Performed by: INTERNAL MEDICINE

## 2023-12-26 PROCEDURE — 99214 OFFICE O/P EST MOD 30 MIN: CPT | Performed by: INTERNAL MEDICINE

## 2023-12-26 PROCEDURE — 3074F SYST BP LT 130 MM HG: CPT | Performed by: INTERNAL MEDICINE

## 2023-12-26 PROCEDURE — 93005 ELECTROCARDIOGRAM TRACING: CPT | Performed by: INTERNAL MEDICINE

## 2023-12-26 PROCEDURE — 1036F TOBACCO NON-USER: CPT | Performed by: INTERNAL MEDICINE

## 2023-12-26 PROCEDURE — 3078F DIAST BP <80 MM HG: CPT | Performed by: INTERNAL MEDICINE

## 2023-12-26 NOTE — PROGRESS NOTES
Referring Provider: Debra Martin MD  Reason for Consult: Congenital Heart block    History of Present Illness:      Yanelis Carrillo is a 23 y.o. year old female patient with a history significant for suspected she initially presented to congenital heart block who is here today for follow-up.    [facility] in mid November with complaints of chest pain and lightheadedness.  She was found to be in complete heart block at that time.  A year earlier, she was pregnant, and had an EKG which was read as second-degree AV block at that time, but in retrospect was likely complete heart block.  She then had an echocardiogram, which showed a normal LV ejection fraction.  Her heart rhythm was read as atrial fibrillation at that time, but in retrospect it also seems to have been complete heart block.  Going back to mid November, she was transferred to [facility].  A cardiac MRI was obtained, which was completed normal.  Based on this, she underwent implantation of a dual-chamber Medtronic pacemaker with a left bundle branch area lead on 11/17/2023.    She did well after pacemaker implantation was discharged from the hospital.  After the procedure, she was complaining of an intermittent cough.  A chest x-ray was obtained to ensure that there was no lead dislodgment that could have been causing intermittent phrenic stimulation.  Chest x-ray showed no lead dislodgment.  Device interrogation since implantation have also shown a normally functioning device.    She has no complaints today.  She notes that sometimes her heart rates are elevated, which is to be expected as she is no longer in complete heart block.      Focused Cardiovascular Problem List:  Likely congenital complete heart block, status post dual-chamber pacemaker: Presented to the ED with symptomatic lightheadedness and chest pain in November 2023.  Underwent successful dual-chamber implantation of a Medtronic pacemaker with a left bundle  branch area pacing lead on 2023.  Gestational hypertension      Past Medical and Surgical History:  Ms. Carrillo  has a past medical history of Acute superficial venous thrombosis of lower extremity (2023), Anemia (2023), Anxiety and depression (2023), Gestational hypertension (2023), Pain in unspecified ankle and joints of unspecified foot, Personal history of other (healed) physical injury and trauma, Personal history of other (healed) physical injury and trauma, Personal history of other diseases of the female genital tract, Personal history of other diseases of the respiratory system, Personal history of other specified conditions, and Second degree AV block (2023).    has a past surgical history that includes  section, low transverse (2022) and Cardiac electrophysiology procedure (N/A, 2023).    Social History:  Social History     Tobacco Use    Smoking status: Never     Passive exposure: Never    Smokeless tobacco: Never   Substance Use Topics    Alcohol use: Never      Tobacco: Denies  Alcohol: Denies  Drug use:  Denies      Relevant Family History:   Family History   Problem Relation Name Age of Onset    Other (sinus tachycardia) Mother      Skin cancer Paternal Grandfather        Allergies:  Allergies   Allergen Reactions    Cefdinir Hives and Anaphylaxis    Metronidazole Anaphylaxis, Hives and Unknown    Peanut Anaphylaxis    Cephalexin Other        Medications:  Current Outpatient Medications   Medication Instructions    escitalopram (LEXAPRO) 20 mg, oral, Daily         Objective   Physical Exam:  Last Recorded Vitals:      2023     3:44 PM 2023     8:31 PM 2023    11:46 PM 2023     3:16 AM 2023     7:38 AM 2023    11:14 AM 2023    10:46 AM   Vitals   Systolic 113 116 117 113 114 114 112   Diastolic 66 63 69 72 64 63 57   Heart Rate 89 75 64 71 64 71 88   Temp 36.7 °C (98.1 °F) 37 °C (98.6 °F) 36.6 °C (97.9  "°F) 36.7 °C (98.1 °F) 36.7 °C (98.1 °F) 36.2 °C (97.2 °F) 36.4 °C (97.5 °F)   Resp 17 18 18  18 18 16   Height (in)       1.676 m (5' 6\")   Weight (lb)       178   BMI       28.73 kg/m2   BSA (m2)       1.94 m2   Visit Report       Report    Visit Vitals  /57 (BP Location: Right arm, Patient Position: Sitting)   Pulse 88   Temp 36.4 °C (97.5 °F)   Resp 16   Ht 1.676 m (5' 6\")   Wt 80.7 kg (178 lb)   SpO2 98%   BMI 28.73 kg/m²   OB Status Having periods   Smoking Status Never   BSA 1.94 m²      Gen: NAD, sitting comfortably  HEENT: NC/AT  Chest: Device in situ in left upper chest, no overlying erythema or tenderness.  There is a keloid over the incision  Card: RRR, no m/r/g  Pulm: Clear to auscultation bilaterally  Ext: No LE edema  Neuro: No focal deficits    Diagnostic Results      My Interpretation of Reviewed Study(s):  Prior ECGs (reviewed and my interpretation):   12/6/2023: Atrial sensed ventricular paced rhythm, unipolar pacing spike.      Echocardiography:    Transthoracic Echo (TTE) Complete    Result Date: 11/15/2023   [facility], [address] [place] [address]                Tel [phone] and Fax [phone] TRANSTHORACIC ECHOCARDIOGRAM REPORT  Patient Name:      SALINA Funes Physician:    [id number] Frank Babcock MD Study Date:        11/15/2023          Ordering Provider:    [id number] CARMENZA DEL VALLE MRN/PID:           [id number]            Fellow: Accession#:        [id number]        Nurse:                Lauren Fontanez RN Date of Birth/Age: 2000 / 23 years Sonographer:          Shanna Hollingsworth                                                              Cardiac Sonographer                                                              Intern Gender:            F                   Additional Staff:     Vanessa Broussard"                                     HINA TERRY Height:            167.64 cm           Admit Date: Weight:            77.11 kg            Admission Status:     Inpatient - Routine BSA:               1.87 m2             Encounter#:           [id number]                                        Department Location:  [facility]                                                              Invasive Blood Pressure: 133 /80 mmHg Study Type:    TRANSTHORACIC ECHO (TTE) COMPLETE Diagnosis/ICD: Atrioventricular block, complete-I44.2 Indication:    Third Degree Heart Block CPT Code:      Echo Complete w Full [id number] Patient History: Pertinent History: Third Degree Heart Block, A-fib. Study Detail: The following Echo studies were performed: 2D, M-Mode, Doppler and               color flow. Technically challenging study due to poor acoustic               windows. Definity used as a contrast agent for endocardial border               definition. Total contrast used for this procedure was 2 mL via IV               push.  PHYSICIAN INTERPRETATION: Left Ventricle: The left ventricular systolic function is normal, with an estimated ejection fraction of 60%. There are no regional wall motion abnormalities. The left ventricular cavity size is normal. Left ventricular diastolic filling was indeterminate. Left Atrium: The left atrium is normal in size. Right Ventricle: The right ventricle is normal in size. There is normal right ventricular global systolic function. Right Atrium: The right atrium is normal in size. Aortic Valve: The aortic valve is probably trileaflet. There is no evidence of aortic valve stenosis. There is trivial aortic valve regurgitation. The peak instantaneous gradient of the aortic valve is 10.8 mmHg. The mean gradient of the aortic valve is 6.0 mmHg. Mitral Valve: The mitral valve is normal in structure. There is trace mitral valve regurgitation. Tricuspid Valve: The tricuspid valve is structurally  normal. There is trace to mild tricuspid regurgitation. Pulmonic Valve: The pulmonic valve is not well visualized. There is trace pulmonic valve regurgitation. Pericardium: There is no pericardial effusion noted. Aorta: The aortic root is normal. There is no dilatation of the ascending aorta. Systemic Veins: The inferior vena cava appears to be of normal size. There is IVC inspiratory collapse greater than 50%.  CONCLUSIONS:  1. Left ventricular systolic function is normal with a 60% estimated ejection fraction. QUANTITATIVE DATA SUMMARY: 2D MEASUREMENTS:                          Normal Ranges: Ao Root d:     2.80 cm   (2.0-3.7cm) LAs:           2.90 cm   (2.7-4.0cm) IVSd:          0.70 cm   (0.6-1.1cm) LVPWd:         0.60 cm   (0.6-1.1cm) LVIDd:         5.10 cm   (3.9-5.9cm) LVIDs:         3.30 cm LV Mass Index: 58.0 g/m2 LV % FS        35.3 % LA VOLUME:                               Normal Ranges: LA Vol A4C:        27.5 ml    (22+/-6mL/m2) LA Vol A2C:        51.9 ml LA Vol BP:         38.4 ml LA Vol Index A4C:  14.7ml/m2 LA Vol Index A2C:  27.8 ml/m2 LA Vol Index BP:   20.6 ml/m2 LA Area A4C:       12.8 cm2 LA Area A2C:       17.9 cm2 LA Major Axis A4C: 5.1 cm LA Major Axis A2C: 5.2 cm LA Volume Index:   20.6 ml/m2 RA VOLUME BY A/L METHOD:                       Normal Ranges: RA Area A4C: 11.8 cm2 AORTA MEASUREMENTS:                    Normal Ranges: Asc Ao, d: 2.70 cm (2.1-3.4cm) LV SYSTOLIC FUNCTION BY 2D PLANIMETRY (MOD):                     Normal Ranges: EF-A4C View: 58.9 % (>=55%) EF-A2C View: 53.4 % EF-Biplane:  56.5 % LV DIASTOLIC FUNCTION:                     Normal Ranges: MV Peak E: 1.38 m/s (0.7-1.2 m/s) MV Peak A: 0.45 m/s (0.42-0.7 m/s) E/A Ratio: 3.07     (1.0-2.2) MV DT:     281 msec (150-240 msec) MITRAL VALVE:                 Normal Ranges: MV DT: 281 msec (150-240msec) AORTIC VALVE:                                    Normal Ranges: AoV Vmax:                1.64 m/s  (<=1.7m/s) AoV Peak PG:              10.8 mmHg (<20mmHg) AoV Mean P.0 mmHg  (1.7-11.5mmHg) LVOT Max Steven:            1.27 m/s  (<=1.1m/s) AoV VTI:                 36.30 cm  (18-25cm) LVOT VTI:                30.30 cm LVOT Diameter:           2.00 cm   (1.8-2.4cm) AoV Area, VTI:           2.62 cm2  (2.5-5.5cm2) AoV Area,Vmax:           2.43 cm2  (2.5-4.5cm2) AoV Dimensionless Index: 0.83  RIGHT VENTRICLE: TAPSE: 27.8 mm RV s'  0.11 m/s TRICUSPID VALVE/RVSP:                   Normal Ranges: IVC Diam: 1.50 cm PULMONIC VALVE:                         Normal Ranges: PV Accel Time: 165 msec (>120ms) PV Max Steven:    1.2 m/s  (0.6-0.9m/s) PV Max P.4 mmHg  [id number] Frank Babcock MD Electronically signed on 11/15/2023 at 2:53:16 PM  ** Final **         Other Relevant Imaging:  Cardiac MRI 11/15/2023  1. Upper-normal LV size (EDVi 101 ml/m2) with normal systolic  function (LVEF 62%). No RWMA.  2. Normal LV wall thickness and indexed LV mass.  3. No evidence of myocardial edema/inflammation on T2 weighted  imaging.  4. No evidence of myocardial fibrosis, infarction or infiltration on  LGE imaging.  5. Upper-normal RV size (EDVi 98 ml/m2) with normal systolic function  (RVEF 51%).    Relevant Labs:  Lab Results   Component Value Date    CREATININE 0.74 2023    CREATININE 0.78 11/15/2023    CREATININE 0.83 2023    K 4.0 2023    K 3.7 11/15/2023    K 3.8 2023    HGBA1C 5.5 2023    HGBA1C 5.6 2022    HGB 12.9 2023    HGB 12.1 11/15/2023    HGB 11.3 (L) 2023    INR 0.9 2022    AST 17 2023    AST 16 2022    AST 13 2022    ALT 16 2023    ALT 9 2022    ALT 10 2022       Assessment/Plan   Assessment and Plan:  Yanelis Carrillo is a 23 y.o. year old female patient who is referred for management and evaluation of congenital complete heart block status post implantation of a Medtronic dual-chamber pacemaker with left bundle area pacing lead on 2023.     She is  doing well today with no complaints.  She does have a keloid over her incision site, but otherwise it is healing well.  No further arm restrictions are necessary.  I interrogated her device as well, and everything seems to be functioning well.  I did decrease her upper tracking rate from 190 to 180 bpm and decrease her sensor rate to 165 bpm as she is complaining of her heart rate beating fast.  Her baseline resting heart rate is still in the 100s.  I suspect that this will take some time to come down as she is likely used to a higher resting atrial rate due to her congenital complete heart block         Return to Clinic: Patient should return to the EP Clinic in 6 months     Thank you very much for allowing me to participate in the care of this patient. Please do not hesitate to contact me with any further questions or concerns.    Debra Martin MD  Clinical Cardiac Electrophysiologist  Director of Atrial Fibrillation Ablation, [facility]  Director of Ventricular Arrhythmias Research, [facility]  Office Phone Number: [phone]

## 2024-01-08 DIAGNOSIS — F32.A ANXIETY AND DEPRESSION: ICD-10-CM

## 2024-01-08 DIAGNOSIS — F41.9 ANXIETY AND DEPRESSION: ICD-10-CM

## 2024-01-08 RX ORDER — ESCITALOPRAM OXALATE 20 MG/1
20 TABLET ORAL DAILY
Qty: 30 TABLET | Refills: 0 | Status: SHIPPED | OUTPATIENT
Start: 2024-01-08 | End: 2024-01-11 | Stop reason: ALTCHOICE

## 2024-01-08 NOTE — TELEPHONE ENCOUNTER
Patient unable to come in until Friday due to work not allowing her time off.    Are you willing to send a week supply so the patient doesn't run out of medication?  Please advise

## 2024-01-08 NOTE — TELEPHONE ENCOUNTER
Patient requests prescription below    Last Office Visit: 04/10/23    Requested Prescriptions     Pending Prescriptions Disp Refills    escitalopram (Lexapro) 20 mg tablet 90 tablet 0     Sig: Take 1 tablet (20 mg) by mouth once daily.

## 2024-01-11 ENCOUNTER — OFFICE VISIT (OUTPATIENT)
Dept: PEDIATRICS | Facility: CLINIC | Age: 24
End: 2024-01-11
Payer: COMMERCIAL

## 2024-01-11 ENCOUNTER — HOSPITAL ENCOUNTER (OUTPATIENT)
Dept: CARDIOLOGY | Facility: HOSPITAL | Age: 24
Discharge: HOME | End: 2024-01-11
Payer: COMMERCIAL

## 2024-01-11 VITALS
SYSTOLIC BLOOD PRESSURE: 104 MMHG | WEIGHT: 177.2 LBS | RESPIRATION RATE: 20 BRPM | HEART RATE: 76 BPM | TEMPERATURE: 97 F | DIASTOLIC BLOOD PRESSURE: 68 MMHG | BODY MASS INDEX: 28.6 KG/M2

## 2024-01-11 DIAGNOSIS — F32.A ANXIETY AND DEPRESSION: Primary | ICD-10-CM

## 2024-01-11 DIAGNOSIS — I44.2 ATRIOVENTRICULAR BLOCK, COMPLETE (MULTI): ICD-10-CM

## 2024-01-11 DIAGNOSIS — Z95.0 CARDIAC PACEMAKER IN SITU: ICD-10-CM

## 2024-01-11 DIAGNOSIS — F41.9 ANXIETY AND DEPRESSION: Primary | ICD-10-CM

## 2024-01-11 PROCEDURE — 93290 INTERROG DEV EVAL ICPMS IP: CPT | Performed by: INTERNAL MEDICINE

## 2024-01-11 PROCEDURE — 93280 PM DEVICE PROGR EVAL DUAL: CPT | Performed by: INTERNAL MEDICINE

## 2024-01-11 PROCEDURE — 99213 OFFICE O/P EST LOW 20 MIN: CPT | Performed by: PEDIATRICS

## 2024-01-11 PROCEDURE — 93280 PM DEVICE PROGR EVAL DUAL: CPT

## 2024-01-11 PROCEDURE — 3078F DIAST BP <80 MM HG: CPT | Performed by: PEDIATRICS

## 2024-01-11 PROCEDURE — 3074F SYST BP LT 130 MM HG: CPT | Performed by: PEDIATRICS

## 2024-01-11 PROCEDURE — 1036F TOBACCO NON-USER: CPT | Performed by: PEDIATRICS

## 2024-01-11 RX ORDER — ESCITALOPRAM OXALATE 20 MG/1
20 TABLET ORAL DAILY
Qty: 90 TABLET | Refills: 0 | Status: SHIPPED | OUTPATIENT
Start: 2024-01-11 | End: 2024-05-10 | Stop reason: SDUPTHER

## 2024-01-11 NOTE — PROGRESS NOTES
Subjective   Patient ID: Yanelis Carrillo is a 23 y.o. female who presents for No chief complaint on file..  Today she is accompanied by accompanied by self .     HPI  On   Current Outpatient Medications:     escitalopram (Lexapro) 20 mg tablet, Take 1 tablet (20 mg) by mouth once daily., Disp: 30 tablet, Rfl: 0   Compliant Yes  EffectiveYes  Side effects No  Review of Systems    Objective   /68   Pulse 76   Temp 36.1 °C (97 °F)   Resp 20   Wt 80.4 kg (177 lb 3.2 oz)   BMI 28.60 kg/m²     Physical Exam  Constitutional:       Appearance: Normal appearance.   Cardiovascular:      Rate and Rhythm: Normal rate and regular rhythm.   Pulmonary:      Breath sounds: Normal breath sounds.   Neurological:      General: No focal deficit present.      Mental Status: She is alert.   Psychiatric:         Mood and Affect: Mood normal.         Assessment/Plan   Diagnoses and all orders for this visit:  Anxiety and depression  -     escitalopram (Lexapro) 20 mg tablet; Take 1 tablet (20 mg) by mouth once daily. Take 1/2 Tablet daily x 7 days, then 1 tablet PO QD  Doing well on current meds  Cont current RX  Follow up in 3 months

## 2024-01-12 ENCOUNTER — HOSPITAL ENCOUNTER (OUTPATIENT)
Dept: CARDIOLOGY | Facility: HOSPITAL | Age: 24
Discharge: HOME | End: 2024-01-12
Payer: COMMERCIAL

## 2024-01-12 ENCOUNTER — APPOINTMENT (OUTPATIENT)
Dept: PEDIATRICS | Facility: CLINIC | Age: 24
End: 2024-01-12
Payer: COMMERCIAL

## 2024-01-12 DIAGNOSIS — Z95.0 CARDIAC PACEMAKER IN SITU: ICD-10-CM

## 2024-01-12 DIAGNOSIS — I44.2 ATRIOVENTRICULAR BLOCK, COMPLETE (MULTI): ICD-10-CM

## 2024-01-12 DIAGNOSIS — Z95.0 CARDIAC PACEMAKER IN SITU: Primary | ICD-10-CM

## 2024-01-12 PROCEDURE — 93290 INTERROG DEV EVAL ICPMS IP: CPT | Performed by: INTERNAL MEDICINE

## 2024-01-12 PROCEDURE — 93280 PM DEVICE PROGR EVAL DUAL: CPT | Performed by: INTERNAL MEDICINE

## 2024-01-12 PROCEDURE — 93280 PM DEVICE PROGR EVAL DUAL: CPT

## 2024-02-23 ENCOUNTER — OFFICE VISIT (OUTPATIENT)
Dept: OBSTETRICS AND GYNECOLOGY | Facility: CLINIC | Age: 24
End: 2024-02-23
Payer: COMMERCIAL

## 2024-02-23 VITALS
DIASTOLIC BLOOD PRESSURE: 72 MMHG | HEIGHT: 66 IN | WEIGHT: 182.25 LBS | SYSTOLIC BLOOD PRESSURE: 110 MMHG | BODY MASS INDEX: 29.29 KG/M2

## 2024-02-23 DIAGNOSIS — N93.9 ABNORMAL UTERINE BLEEDING (AUB): Primary | ICD-10-CM

## 2024-02-23 DIAGNOSIS — N89.8 VAGINAL ODOR: ICD-10-CM

## 2024-02-23 LAB
ERYTHROCYTE [DISTWIDTH] IN BLOOD BY AUTOMATED COUNT: 15.6 % (ref 11.5–14.5)
HCT VFR BLD AUTO: 36.5 % (ref 36–46)
HGB BLD-MCNC: 10.9 G/DL (ref 12–16)
MCH RBC QN AUTO: 23.3 PG (ref 26–34)
MCHC RBC AUTO-ENTMCNC: 29.9 G/DL (ref 32–36)
MCV RBC AUTO: 78 FL (ref 80–100)
NRBC BLD-RTO: 0 /100 WBCS (ref 0–0)
PLATELET # BLD AUTO: 241 X10*3/UL (ref 150–450)
PREGNANCY TEST URINE, POC: NEGATIVE
RBC # BLD AUTO: 4.68 X10*6/UL (ref 4–5.2)
TSH SERPL-ACNC: 1.28 MIU/L (ref 0.44–3.98)
WBC # BLD AUTO: 5.9 X10*3/UL (ref 4.4–11.3)

## 2024-02-23 PROCEDURE — 1036F TOBACCO NON-USER: CPT

## 2024-02-23 PROCEDURE — 81025 URINE PREGNANCY TEST: CPT

## 2024-02-23 PROCEDURE — 84443 ASSAY THYROID STIM HORMONE: CPT

## 2024-02-23 PROCEDURE — 87205 SMEAR GRAM STAIN: CPT

## 2024-02-23 PROCEDURE — 99214 OFFICE O/P EST MOD 30 MIN: CPT

## 2024-02-23 PROCEDURE — 36415 COLL VENOUS BLD VENIPUNCTURE: CPT

## 2024-02-23 PROCEDURE — 3078F DIAST BP <80 MM HG: CPT

## 2024-02-23 PROCEDURE — 3074F SYST BP LT 130 MM HG: CPT

## 2024-02-23 PROCEDURE — 85027 COMPLETE CBC AUTOMATED: CPT

## 2024-02-23 NOTE — PROGRESS NOTES
Patient presents today for possible BV.  Patient c/o foul odor, heavy bleeding for 3 days, dark red blood, & dull pelvic pain.  She denies any clots.  She states that on Thursday she was very dizzy at work for 2 hours.    GANGA SUERO MA    Patient here today for concerns of heavy menstrual bleeding. Patient reports that she noticed spotting two days ago and then yesterday her bleeding became heavier to where she required  3 tampons. Patient reports that bleeding continued to increase in amount to where she is saturating a super tampon every 1-2 hours. Patient also reports mild cramping and a foul odor since bleeding started. Menses typically occur every 28-30 days. Current cycle length 15 days.     Patient reports that she has been trying to conceive.  LMP 2/5/24.     Physical Exam  Constitutional:       Appearance: Normal appearance.   Eyes:      Conjunctiva/sclera: Conjunctivae normal.   Pulmonary:      Effort: Pulmonary effort is normal.   Genitourinary:     Cervix: No cervical motion tenderness.      Uterus: Normal.       Adnexa: Right adnexa normal and left adnexa normal.      Comments: Bleeding present in vaginal vault. No odor appreciated  Neurological:      Mental Status: She is alert.   Psychiatric:         Mood and Affect: Mood normal.        Plan: discussed potential causes of AUB, PALM-COEIN. In office UPT neg.   CBC today to assess for anemia due to reports of dizziness. TSH today for AUB. Offered pelvic US to r/o structural causes of AUB and patient declines at this time. Discussed NSAID management to control menses flow: Ibuprofen 600 mg two to four times daily or   Naproxen 500 mg at onset followed by 250  mg to 500 mg twice a day.    Vaginal odor: Vaginitis culture obtained today; will await result prior to treatment.  Advised use of Rephresh/Ultra Virgen Women's  vaginal health probiotics. Patient encouraged to consider changes in her diet to include of probiotics, unsweetened yogurt,  fermented foods and drinks, and decrease in sugar.

## 2024-02-24 LAB
CLUE CELLS VAG LPF-#/AREA: NORMAL /[LPF]
NUGENT SCORE: 2
YEAST VAG WET PREP-#/AREA: NORMAL

## 2024-03-27 ENCOUNTER — HOSPITAL ENCOUNTER (OUTPATIENT)
Dept: CARDIOLOGY | Facility: HOSPITAL | Age: 24
Discharge: HOME | End: 2024-03-27
Payer: COMMERCIAL

## 2024-03-27 DIAGNOSIS — Z95.0 CARDIAC PACEMAKER IN SITU: Primary | ICD-10-CM

## 2024-03-27 DIAGNOSIS — Z95.0 CARDIAC PACEMAKER IN SITU: ICD-10-CM

## 2024-03-27 DIAGNOSIS — I44.2 ATRIOVENTRICULAR BLOCK, COMPLETE (MULTI): ICD-10-CM

## 2024-03-27 PROCEDURE — 93294 REM INTERROG EVL PM/LDLS PM: CPT | Performed by: INTERNAL MEDICINE

## 2024-03-27 PROCEDURE — 93296 REM INTERROG EVL PM/IDS: CPT

## 2024-04-11 ENCOUNTER — TELEPHONE (OUTPATIENT)
Dept: OBSTETRICS AND GYNECOLOGY | Facility: CLINIC | Age: 24
End: 2024-04-11

## 2024-04-11 DIAGNOSIS — N30.00 ACUTE CYSTITIS WITHOUT HEMATURIA: Primary | ICD-10-CM

## 2024-04-11 RX ORDER — NITROFURANTOIN 25; 75 MG/1; MG/1
100 CAPSULE ORAL 2 TIMES DAILY
Qty: 14 CAPSULE | Refills: 0 | Status: SHIPPED | OUTPATIENT
Start: 2024-04-11 | End: 2024-04-18

## 2024-04-11 NOTE — TELEPHONE ENCOUNTER
Pt called and states she thinks she has a UTI. She is having burning with urination, feeling bladder spasms and feeling the need to go again right after urinating. She is home today with her child who is sick and was wondering if you could call a prescription in for her. Please advise.

## 2024-04-16 ENCOUNTER — OFFICE VISIT (OUTPATIENT)
Dept: PRIMARY CARE CLINIC | Age: 24
End: 2024-04-16
Payer: COMMERCIAL

## 2024-04-16 VITALS
HEART RATE: 105 BPM | DIASTOLIC BLOOD PRESSURE: 58 MMHG | OXYGEN SATURATION: 99 % | WEIGHT: 182 LBS | BODY MASS INDEX: 29.25 KG/M2 | SYSTOLIC BLOOD PRESSURE: 118 MMHG | HEIGHT: 66 IN | TEMPERATURE: 98.5 F

## 2024-04-16 DIAGNOSIS — R09.81 CONGESTION OF NASAL SINUS: ICD-10-CM

## 2024-04-16 DIAGNOSIS — H66.90 ACUTE OTITIS MEDIA, UNSPECIFIED OTITIS MEDIA TYPE: Primary | ICD-10-CM

## 2024-04-16 PROCEDURE — 1036F TOBACCO NON-USER: CPT | Performed by: STUDENT IN AN ORGANIZED HEALTH CARE EDUCATION/TRAINING PROGRAM

## 2024-04-16 PROCEDURE — 99213 OFFICE O/P EST LOW 20 MIN: CPT | Performed by: STUDENT IN AN ORGANIZED HEALTH CARE EDUCATION/TRAINING PROGRAM

## 2024-04-16 PROCEDURE — G8427 DOCREV CUR MEDS BY ELIG CLIN: HCPCS | Performed by: STUDENT IN AN ORGANIZED HEALTH CARE EDUCATION/TRAINING PROGRAM

## 2024-04-16 PROCEDURE — G8419 CALC BMI OUT NRM PARAM NOF/U: HCPCS | Performed by: STUDENT IN AN ORGANIZED HEALTH CARE EDUCATION/TRAINING PROGRAM

## 2024-04-16 RX ORDER — DOXYCYCLINE HYCLATE 100 MG
100 TABLET ORAL 2 TIMES DAILY
Qty: 14 TABLET | Refills: 0 | Status: SHIPPED | OUTPATIENT
Start: 2024-04-16 | End: 2024-04-23

## 2024-04-16 RX ORDER — FEXOFENADINE HCL 180 MG/1
180 TABLET ORAL DAILY
Qty: 30 TABLET | Refills: 0 | Status: SHIPPED | OUTPATIENT
Start: 2024-04-16 | End: 2024-05-16

## 2024-04-16 RX ORDER — FLUTICASONE PROPIONATE 50 MCG
2 SPRAY, SUSPENSION (ML) NASAL DAILY
Qty: 16 G | Refills: 0 | Status: SHIPPED | OUTPATIENT
Start: 2024-04-16

## 2024-04-16 ASSESSMENT — PATIENT HEALTH QUESTIONNAIRE - PHQ9
SUM OF ALL RESPONSES TO PHQ QUESTIONS 1-9: 0
SUM OF ALL RESPONSES TO PHQ9 QUESTIONS 1 & 2: 0
SUM OF ALL RESPONSES TO PHQ QUESTIONS 1-9: 0
2. FEELING DOWN, DEPRESSED OR HOPELESS: NOT AT ALL
1. LITTLE INTEREST OR PLEASURE IN DOING THINGS: NOT AT ALL

## 2024-04-16 NOTE — PROGRESS NOTES
[facility]  [address]  [address]  [place] [address]  Dept: [phone]  Dept Fax: [phone]  Loc: [phone]     2024    Visit type: Acute care    Reason for Visit: Sinusitis (Pt c/o pressure behind her face, dry cough, rhinorrhea that has been going on for 3 days.  Denies pharyngitis, chills, fever, body aches. ) and Otalgia (Pt c/o Lt otalgia, Lt ear muffle sound, lightheaded and dizziness when blowing nose. States she was diagnosed of bilateral ear infection, completed antibiotic w/ o relief.  Symptoms has been going on for abt 2 wks.  Denies tinnitus, discharge. )       ASSESSMENT/PLAN   1. Acute otitis media, unspecified otitis media type  -     doxycycline hyclate (VIBRA-TABS) 100 MG tablet; Take 1 tablet by mouth 2 times daily for 7 days, Disp-14 tablet, R-0Normal  -     fluticasone (FLONASE) 50 MCG/ACT nasal spray; 2 sprays by Each Nostril route daily, Disp-16 g, R-0Normal  -     fexofenadine (ALLEGRA) 180 MG tablet; Take 1 tablet by mouth daily, Disp-30 tablet, R-0Normal  2. Congestion of nasal sinus  -     fluticasone (FLONASE) 50 MCG/ACT nasal spray; 2 sprays by Each Nostril route daily, Disp-16 g, R-0Normal  -     fexofenadine (ALLEGRA) 180 MG tablet; Take 1 tablet by mouth daily, Disp-30 tablet, R-0Normal      Reviewed medical, surgical, social and family history. Also reviewed current medications and allergies.  No follow-ups on file.  Orders Placed This Encounter   Medications    doxycycline hyclate (VIBRA-TABS) 100 MG tablet     Sig: Take 1 tablet by mouth 2 times daily for 7 days     Dispense:  14 tablet     Refill:  0    fluticasone (FLONASE) 50 MCG/ACT nasal spray     Si sprays by Each Nostril route daily     Dispense:  16 g     Refill:  0    fexofenadine (ALLEGRA) 180 MG tablet     Sig: Take 1 tablet by mouth daily     Dispense:  30 tablet     Refill:  0      Subjective    Patient: Isabel Kramer

## 2024-04-19 ENCOUNTER — OFFICE VISIT (OUTPATIENT)
Dept: PRIMARY CARE | Facility: CLINIC | Age: 24
End: 2024-04-19
Payer: COMMERCIAL

## 2024-04-19 VITALS
HEIGHT: 66 IN | WEIGHT: 181 LBS | HEART RATE: 74 BPM | BODY MASS INDEX: 29.09 KG/M2 | DIASTOLIC BLOOD PRESSURE: 64 MMHG | SYSTOLIC BLOOD PRESSURE: 128 MMHG | OXYGEN SATURATION: 96 % | RESPIRATION RATE: 18 BRPM

## 2024-04-19 DIAGNOSIS — D64.9 ANEMIA, UNSPECIFIED TYPE: ICD-10-CM

## 2024-04-19 DIAGNOSIS — F32.A ANXIETY AND DEPRESSION: ICD-10-CM

## 2024-04-19 DIAGNOSIS — I44.2 THIRD DEGREE HEART BLOCK (MULTI): ICD-10-CM

## 2024-04-19 DIAGNOSIS — Z00.00 ROUTINE GENERAL MEDICAL EXAMINATION AT A HEALTH CARE FACILITY: Primary | ICD-10-CM

## 2024-04-19 DIAGNOSIS — F41.9 ANXIETY AND DEPRESSION: ICD-10-CM

## 2024-04-19 RX ORDER — DOXYCYCLINE HYCLATE 100 MG
100 TABLET ORAL
COMMUNITY
Start: 2024-04-16 | End: 2024-04-23

## 2024-04-19 ASSESSMENT — ENCOUNTER SYMPTOMS
APNEA: 0
CHOKING: 0
GASTROINTESTINAL NEGATIVE: 1
SHORTNESS OF BREATH: 0
HEMATOLOGIC/LYMPHATIC NEGATIVE: 1
ALLERGIC/IMMUNOLOGIC NEGATIVE: 1
CHEST TIGHTNESS: 0
NEUROLOGICAL NEGATIVE: 1
POLYDIPSIA: 0
PSYCHIATRIC NEGATIVE: 1
ENDOCRINE NEGATIVE: 1
EYES NEGATIVE: 1
WHEEZING: 0
CONSTITUTIONAL NEGATIVE: 1
PALPITATIONS: 0
POLYPHAGIA: 0
COUGH: 0
MUSCULOSKELETAL NEGATIVE: 1
STRIDOR: 0
CARDIOVASCULAR NEGATIVE: 1

## 2024-04-19 ASSESSMENT — ANXIETY QUESTIONNAIRES
5. BEING SO RESTLESS THAT IT IS HARD TO SIT STILL: NOT AT ALL
4. TROUBLE RELAXING: NOT AT ALL
1. FEELING NERVOUS, ANXIOUS, OR ON EDGE: NOT AT ALL
7. FEELING AFRAID AS IF SOMETHING AWFUL MIGHT HAPPEN: NOT AT ALL
IF YOU CHECKED OFF ANY PROBLEMS ON THIS QUESTIONNAIRE, HOW DIFFICULT HAVE THESE PROBLEMS MADE IT FOR YOU TO DO YOUR WORK, TAKE CARE OF THINGS AT HOME, OR GET ALONG WITH OTHER PEOPLE: NOT DIFFICULT AT ALL
3. WORRYING TOO MUCH ABOUT DIFFERENT THINGS: NOT AT ALL
2. NOT BEING ABLE TO STOP OR CONTROL WORRYING: NOT AT ALL
6. BECOMING EASILY ANNOYED OR IRRITABLE: NOT AT ALL
GAD7 TOTAL SCORE: 0

## 2024-04-19 ASSESSMENT — PATIENT HEALTH QUESTIONNAIRE - PHQ9
SUM OF ALL RESPONSES TO PHQ9 QUESTIONS 1 AND 2: 0
1. LITTLE INTEREST OR PLEASURE IN DOING THINGS: NOT AT ALL
2. FEELING DOWN, DEPRESSED OR HOPELESS: NOT AT ALL

## 2024-04-19 NOTE — PROGRESS NOTES
"Subjective   Patient ID: Yanelis Carrillo is a 23 y.o. female who presents for Establish Care.    Patient had some complications after pregnancy with heart block and was subsequently treated with a demand pacemaker-not and AICD.   Has one child, he will be two in July.   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   Patient had a PAP with HPV testing 2022. Patient is trying to have another child but is having a hard time right now.   Her boyfriend had some medical issues meningitis-was 2 months post partum when he had complications was in a coma. Is doing better now- met spine rehab.   Doesn't go to eye doctor- hasn't been to dentist advised to go.          Review of Systems   Constitutional: Negative.    HENT: Negative.     Eyes: Negative.    Respiratory:  Negative for apnea, cough, choking, chest tightness, shortness of breath, wheezing and stridor.    Cardiovascular: Negative.  Negative for chest pain, palpitations and leg swelling.   Gastrointestinal: Negative.    Endocrine: Negative.  Negative for polydipsia, polyphagia and polyuria.   Genitourinary: Negative.    Musculoskeletal: Negative.    Skin: Negative.    Allergic/Immunologic: Negative.    Neurological: Negative.    Hematological: Negative.    Psychiatric/Behavioral: Negative.         Objective   /64   Pulse 74   Resp 18   Ht 1.676 m (5' 6\")   Wt 82.1 kg (181 lb)   SpO2 96%   BMI 29.21 kg/m²     Physical Exam  Vitals and nursing note reviewed.   Constitutional:       Appearance: Normal appearance.   HENT:      Head: Normocephalic and atraumatic.      Nose: Nose normal.      Mouth/Throat:      Mouth: Mucous membranes are moist.   Eyes:      Pupils: Pupils are equal, round, and reactive to light.   Neck:      Thyroid: No thyroid mass, thyromegaly or thyroid tenderness.      Vascular: Normal carotid pulses. No carotid bruit, hepatojugular reflux or JVD.      Trachea: Trachea normal. No tracheal tenderness, " tracheostomy, abnormal tracheal secretions or tracheal deviation.   Cardiovascular:      Rate and Rhythm: Normal rate and regular rhythm.      Pulses: Normal pulses.      Heart sounds: Normal heart sounds. No murmur heard.     No friction rub. No gallop.   Pulmonary:      Effort: Pulmonary effort is normal. No respiratory distress.      Breath sounds: Normal breath sounds. No stridor. No wheezing, rhonchi or rales.   Chest:      Chest wall: No tenderness.   Abdominal:      Palpations: Abdomen is soft.   Musculoskeletal:         General: Normal range of motion.      Cervical back: Normal range of motion.   Lymphadenopathy:      Cervical: No cervical adenopathy.      Right cervical: No superficial, deep or posterior cervical adenopathy.     Left cervical: No superficial, deep or posterior cervical adenopathy.   Skin:     Capillary Refill: Capillary refill takes 2 to 3 seconds.   Neurological:      General: No focal deficit present.      Mental Status: She is alert and oriented to person, place, and time.      Cranial Nerves: Cranial nerves 2-12 are intact.      Sensory: Sensation is intact.      Motor: Motor function is intact.      Deep Tendon Reflexes: Reflexes are normal and symmetric.         Assessment/Plan   Problem List Items Addressed This Visit             ICD-10-CM    Third degree heart block (Multi) I44.2     Pacemaker in place, rate controlled         Anxiety and depression F41.9, F32.A    Anemia D64.9     Other Visit Diagnoses         Codes    Routine general medical examination at a health care facility    -  Primary Z00.00

## 2024-05-10 DIAGNOSIS — F41.9 ANXIETY AND DEPRESSION: ICD-10-CM

## 2024-05-10 DIAGNOSIS — F32.A ANXIETY AND DEPRESSION: ICD-10-CM

## 2024-05-10 RX ORDER — ESCITALOPRAM OXALATE 20 MG/1
20 TABLET ORAL DAILY
Qty: 90 TABLET | Refills: 0 | Status: SHIPPED | OUTPATIENT
Start: 2024-05-10 | End: 2024-08-08

## 2024-05-17 DIAGNOSIS — H66.90 ACUTE OTITIS MEDIA, UNSPECIFIED OTITIS MEDIA TYPE: Primary | ICD-10-CM

## 2024-05-17 RX ORDER — AZITHROMYCIN 250 MG/1
TABLET, FILM COATED ORAL DAILY
Qty: 6 TABLET | Refills: 0 | Status: SHIPPED | OUTPATIENT
Start: 2024-05-17 | End: 2024-05-22

## 2024-05-22 ENCOUNTER — APPOINTMENT (OUTPATIENT)
Dept: ALLERGY | Facility: HOSPITAL | Age: 24
End: 2024-05-22
Payer: COMMERCIAL

## 2024-05-29 ENCOUNTER — OFFICE VISIT (OUTPATIENT)
Dept: OBSTETRICS AND GYNECOLOGY | Facility: CLINIC | Age: 24
End: 2024-05-29
Payer: COMMERCIAL

## 2024-05-29 ENCOUNTER — APPOINTMENT (OUTPATIENT)
Dept: OBSTETRICS AND GYNECOLOGY | Facility: CLINIC | Age: 24
End: 2024-05-29
Payer: COMMERCIAL

## 2024-05-29 VITALS
SYSTOLIC BLOOD PRESSURE: 124 MMHG | BODY MASS INDEX: 28.79 KG/M2 | WEIGHT: 178.38 LBS | DIASTOLIC BLOOD PRESSURE: 72 MMHG

## 2024-05-29 DIAGNOSIS — N89.8 VAGINAL LESION: Primary | ICD-10-CM

## 2024-05-29 DIAGNOSIS — N89.8 VAGINAL ITCHING: ICD-10-CM

## 2024-05-29 PROCEDURE — 87529 HSV DNA AMP PROBE: CPT

## 2024-05-29 PROCEDURE — 99214 OFFICE O/P EST MOD 30 MIN: CPT

## 2024-05-29 PROCEDURE — 87205 SMEAR GRAM STAIN: CPT

## 2024-05-29 PROCEDURE — 1036F TOBACCO NON-USER: CPT

## 2024-05-29 PROCEDURE — 3074F SYST BP LT 130 MM HG: CPT

## 2024-05-29 PROCEDURE — 87591 N.GONORRHOEAE DNA AMP PROB: CPT

## 2024-05-29 PROCEDURE — 87491 CHLMYD TRACH DNA AMP PROBE: CPT

## 2024-05-29 PROCEDURE — 3078F DIAST BP <80 MM HG: CPT

## 2024-05-29 RX ORDER — VALACYCLOVIR HYDROCHLORIDE 1 G/1
1000 TABLET, FILM COATED ORAL 2 TIMES DAILY
Qty: 20 TABLET | Refills: 0 | Status: SHIPPED | OUTPATIENT
Start: 2024-05-29 | End: 2024-06-08

## 2024-05-29 NOTE — PROGRESS NOTES
Subjective   Patient ID: Yanelis Carrillo is a 23 y.o. female who presents for No chief complaint on file..    HPI  Patient presents to the office today with concern for vaginal itching and pain for one week.  Noticed vaginal bumps over the weekend.  States that they have been painful and itching.  Took a Diflucan from Dr. Monahan, unable to tell if any relief.  Thought perhaps she had infected hair follicles, tried Bactrim from Dr. Monahan without relief.  Denies fever or body aches.  Sexually active with one partner, who is present at today's visit.    Last pap 9/2022 normal, HPV neg.    Review of Systems   Genitourinary:  Positive for genital sores.   All other systems reviewed and are negative.      Objective   Physical Exam  Constitutional:       Appearance: Normal appearance.   HENT:      Head: Normocephalic.   Pulmonary:      Effort: Pulmonary effort is normal.   Genitourinary:     Comments: Scattered, small blister appearing lesions to the vulva and vagina.  Skin:     General: Skin is warm and dry.   Neurological:      Mental Status: She is alert and oriented to person, place, and time.   Psychiatric:         Mood and Affect: Mood normal.         Assessment/Plan   Diagnoses and all orders for this visit:  Vaginal itching  -     Vaginitis Gram Stain For Bacterial Vaginosis + Yeast  Vaginal lesion  -     HSV PCR, Skin/Mucosa  -     C. Trachomatis / N. Gonorrhoeae, Amplified Detection    Suspicious for HSV based on clinical presentation -- Will send Valtrex Rx while result pending.  Discussed abstaining from intercourse during outbreaks.  Patient reports understanding, all questions answered at this time.    LAKSHMI Salazar 05/29/24 2:35 PM

## 2024-05-30 LAB
CLUE CELLS VAG LPF-#/AREA: NORMAL /[LPF]
HSV1 DNA SKIN QL NAA+PROBE: DETECTED
HSV2 DNA SKIN QL NAA+PROBE: NOT DETECTED
NUGENT SCORE: 1
YEAST VAG WET PREP-#/AREA: NORMAL

## 2024-05-30 RX ORDER — VALACYCLOVIR HYDROCHLORIDE 500 MG/1
500 TABLET, FILM COATED ORAL 2 TIMES DAILY
Qty: 6 TABLET | Refills: 11 | Status: SHIPPED | OUTPATIENT
Start: 2024-05-30 | End: 2024-06-02

## 2024-05-31 LAB
C TRACH RRNA SPEC QL NAA+PROBE: NEGATIVE
N GONORRHOEA DNA SPEC QL PROBE+SIG AMP: NEGATIVE

## 2024-07-30 ENCOUNTER — APPOINTMENT (OUTPATIENT)
Dept: PRIMARY CARE | Facility: CLINIC | Age: 24
End: 2024-07-30

## 2024-08-01 ENCOUNTER — HOSPITAL ENCOUNTER (OUTPATIENT)
Dept: CARDIOLOGY | Facility: HOSPITAL | Age: 24
Discharge: HOME | End: 2024-08-01

## 2024-08-01 DIAGNOSIS — I44.2 ATRIOVENTRICULAR BLOCK, COMPLETE (MULTI): ICD-10-CM

## 2024-08-01 DIAGNOSIS — Z95.0 CARDIAC PACEMAKER IN SITU: ICD-10-CM

## 2024-08-01 PROCEDURE — 93296 REM INTERROG EVL PM/IDS: CPT

## 2024-08-09 ENCOUNTER — APPOINTMENT (OUTPATIENT)
Dept: PRIMARY CARE | Facility: CLINIC | Age: 24
End: 2024-08-09

## 2024-08-09 DIAGNOSIS — Z11.1 SCREENING EXAMINATION FOR PULMONARY TUBERCULOSIS: Primary | ICD-10-CM

## 2024-08-09 DIAGNOSIS — Z13.9 SCREENING DUE: ICD-10-CM

## 2024-08-09 NOTE — PROGRESS NOTES
Subjective   Patient ID: Yanelis Carrillo is a 24 y.o. female who presents for Nurse Visit.    HPI     Review of Systems    Objective   There were no vitals taken for this visit.    Physical Exam    Assessment/Plan     HCG completed and filled out.

## 2024-08-13 ENCOUNTER — LAB (OUTPATIENT)
Dept: LAB | Facility: LAB | Age: 24
End: 2024-08-13

## 2024-08-13 DIAGNOSIS — F32.A ANXIETY AND DEPRESSION: ICD-10-CM

## 2024-08-13 DIAGNOSIS — B37.9 YEAST INFECTION: ICD-10-CM

## 2024-08-13 DIAGNOSIS — F41.9 ANXIETY AND DEPRESSION: ICD-10-CM

## 2024-08-13 DIAGNOSIS — Z11.1 SCREENING EXAMINATION FOR PULMONARY TUBERCULOSIS: ICD-10-CM

## 2024-08-13 PROCEDURE — 36415 COLL VENOUS BLD VENIPUNCTURE: CPT

## 2024-08-13 PROCEDURE — 86481 TB AG RESPONSE T-CELL SUSP: CPT

## 2024-08-13 RX ORDER — FLUCONAZOLE 150 MG/1
150 TABLET ORAL DAILY
Qty: 1 TABLET | Refills: 3 | OUTPATIENT
Start: 2024-08-13

## 2024-08-13 RX ORDER — ESCITALOPRAM OXALATE 20 MG/1
20 TABLET ORAL DAILY
Qty: 90 TABLET | Refills: 0 | Status: SHIPPED | OUTPATIENT
Start: 2024-08-13 | End: 2024-11-11

## 2024-08-15 LAB
NIL(NEG) CONTROL SPOT COUNT: NORMAL
PANEL A SPOT COUNT: 0
PANEL B SPOT COUNT: 0
POS CONTROL SPOT COUNT: NORMAL
T-SPOT. TB INTERPRETATION: NEGATIVE

## 2024-10-01 ENCOUNTER — TELEPHONE (OUTPATIENT)
Dept: PRIMARY CARE | Facility: CLINIC | Age: 24
End: 2024-10-01

## 2024-10-01 NOTE — TELEPHONE ENCOUNTER
Mynor from [facility]  Phone:  [phone]    Letter needs to state what [facility] guideline prevents her from taking the flu shot.  If this isn't documented or a known allergy then the patient will need to get the flu shot to meet the [facility] guidelines.      Daniela was consulted during the phone conversation with Mynor that conversation was then shared with Mynor.

## 2024-10-23 ENCOUNTER — PATIENT MESSAGE (OUTPATIENT)
Dept: CARDIOLOGY | Facility: CLINIC | Age: 24
End: 2024-10-23

## 2024-10-23 ENCOUNTER — APPOINTMENT (OUTPATIENT)
Dept: PRIMARY CARE | Facility: CLINIC | Age: 24
End: 2024-10-23

## 2024-10-28 ENCOUNTER — HOSPITAL ENCOUNTER (EMERGENCY)
Facility: HOSPITAL | Age: 24
Discharge: HOME | End: 2024-10-28
Attending: STUDENT IN AN ORGANIZED HEALTH CARE EDUCATION/TRAINING PROGRAM
Payer: MEDICAID

## 2024-10-28 VITALS
DIASTOLIC BLOOD PRESSURE: 76 MMHG | RESPIRATION RATE: 16 BRPM | HEART RATE: 64 BPM | TEMPERATURE: 97.2 F | WEIGHT: 180 LBS | BODY MASS INDEX: 28.93 KG/M2 | OXYGEN SATURATION: 100 % | SYSTOLIC BLOOD PRESSURE: 132 MMHG | HEIGHT: 66 IN

## 2024-10-28 DIAGNOSIS — H10.32 ACUTE CONJUNCTIVITIS OF LEFT EYE, UNSPECIFIED ACUTE CONJUNCTIVITIS TYPE: Primary | ICD-10-CM

## 2024-10-28 PROCEDURE — 99283 EMERGENCY DEPT VISIT LOW MDM: CPT

## 2024-10-28 PROCEDURE — 2500000001 HC RX 250 WO HCPCS SELF ADMINISTERED DRUGS (ALT 637 FOR MEDICARE OP): Performed by: STUDENT IN AN ORGANIZED HEALTH CARE EDUCATION/TRAINING PROGRAM

## 2024-10-28 RX ORDER — POLYMYXIN B SULFATE AND TRIMETHOPRIM 1; 10000 MG/ML; [USP'U]/ML
1 SOLUTION OPHTHALMIC EVERY 4 HOURS
Qty: 3 ML | Refills: 0 | Status: SHIPPED | OUTPATIENT
Start: 2024-10-28 | End: 2024-11-07

## 2024-10-28 RX ORDER — TETRACAINE HYDROCHLORIDE 5 MG/ML
1 SOLUTION OPHTHALMIC ONCE
Status: COMPLETED | OUTPATIENT
Start: 2024-10-28 | End: 2024-10-28

## 2024-10-28 ASSESSMENT — LIFESTYLE VARIABLES
EVER FELT BAD OR GUILTY ABOUT YOUR DRINKING: NO
EVER HAD A DRINK FIRST THING IN THE MORNING TO STEADY YOUR NERVES TO GET RID OF A HANGOVER: NO
HAVE PEOPLE ANNOYED YOU BY CRITICIZING YOUR DRINKING: NO
TOTAL SCORE: 0
HAVE YOU EVER FELT YOU SHOULD CUT DOWN ON YOUR DRINKING: NO

## 2024-10-28 ASSESSMENT — PAIN - FUNCTIONAL ASSESSMENT
PAIN_FUNCTIONAL_ASSESSMENT: 0-10
PAIN_FUNCTIONAL_ASSESSMENT: 0-10

## 2024-10-28 ASSESSMENT — VISUAL ACUITY
OD: 20/13
OS: 20/13
OU: 20/13

## 2024-10-28 ASSESSMENT — PAIN DESCRIPTION - LOCATION: LOCATION: EYE

## 2024-10-28 ASSESSMENT — PAIN SCALES - GENERAL
PAINLEVEL_OUTOF10: 3
PAINLEVEL_OUTOF10: 2

## 2024-10-28 ASSESSMENT — COLUMBIA-SUICIDE SEVERITY RATING SCALE - C-SSRS
2. HAVE YOU ACTUALLY HAD ANY THOUGHTS OF KILLING YOURSELF?: NO
6. HAVE YOU EVER DONE ANYTHING, STARTED TO DO ANYTHING, OR PREPARED TO DO ANYTHING TO END YOUR LIFE?: NO
1. IN THE PAST MONTH, HAVE YOU WISHED YOU WERE DEAD OR WISHED YOU COULD GO TO SLEEP AND NOT WAKE UP?: NO

## 2024-10-28 ASSESSMENT — PAIN DESCRIPTION - PAIN TYPE: TYPE: ACUTE PAIN

## 2024-10-29 ENCOUNTER — APPOINTMENT (OUTPATIENT)
Dept: CARDIOLOGY | Facility: CLINIC | Age: 24
End: 2024-10-29

## 2024-11-04 ENCOUNTER — HOSPITAL ENCOUNTER (OUTPATIENT)
Dept: CARDIOLOGY | Facility: HOSPITAL | Age: 24
Discharge: HOME | End: 2024-11-04
Payer: MEDICAID

## 2024-11-04 DIAGNOSIS — Z95.0 CARDIAC PACEMAKER IN SITU: ICD-10-CM

## 2024-11-04 DIAGNOSIS — I44.2 ATRIOVENTRICULAR BLOCK, COMPLETE (MULTI): ICD-10-CM

## 2024-11-04 PROCEDURE — 93294 REM INTERROG EVL PM/LDLS PM: CPT | Performed by: INTERNAL MEDICINE

## 2024-11-04 PROCEDURE — 93296 REM INTERROG EVL PM/IDS: CPT

## 2024-11-14 ENCOUNTER — APPOINTMENT (OUTPATIENT)
Dept: OBSTETRICS AND GYNECOLOGY | Facility: CLINIC | Age: 24
End: 2024-11-14
Payer: MEDICAID

## 2024-11-14 DIAGNOSIS — F32.A ANXIETY AND DEPRESSION: ICD-10-CM

## 2024-11-14 DIAGNOSIS — F41.9 ANXIETY AND DEPRESSION: ICD-10-CM

## 2024-11-14 RX ORDER — ESCITALOPRAM OXALATE 20 MG/1
20 TABLET ORAL DAILY
Qty: 90 TABLET | Refills: 0 | Status: SHIPPED | OUTPATIENT
Start: 2024-11-14 | End: 2025-02-12

## 2024-11-29 ENCOUNTER — APPOINTMENT (OUTPATIENT)
Dept: PRIMARY CARE | Facility: CLINIC | Age: 24
End: 2024-11-29
Payer: MEDICAID

## 2024-12-03 ENCOUNTER — APPOINTMENT (OUTPATIENT)
Dept: PRIMARY CARE | Facility: CLINIC | Age: 24
End: 2024-12-03
Payer: MEDICAID

## 2025-01-17 ENCOUNTER — OFFICE VISIT (OUTPATIENT)
Dept: OBSTETRICS AND GYNECOLOGY | Facility: CLINIC | Age: 25
End: 2025-01-17
Payer: MEDICAID

## 2025-01-17 VITALS — SYSTOLIC BLOOD PRESSURE: 133 MMHG | BODY MASS INDEX: 29.05 KG/M2 | HEIGHT: 66 IN | DIASTOLIC BLOOD PRESSURE: 70 MMHG

## 2025-01-17 DIAGNOSIS — N92.0 MENORRHAGIA WITH REGULAR CYCLE: Primary | ICD-10-CM

## 2025-01-17 PROCEDURE — 3078F DIAST BP <80 MM HG: CPT | Performed by: OBSTETRICS & GYNECOLOGY

## 2025-01-17 PROCEDURE — 99213 OFFICE O/P EST LOW 20 MIN: CPT | Performed by: OBSTETRICS & GYNECOLOGY

## 2025-01-17 PROCEDURE — 3075F SYST BP GE 130 - 139MM HG: CPT | Performed by: OBSTETRICS & GYNECOLOGY

## 2025-01-17 RX ORDER — FERROUS SULFATE 325(65) MG
1 TABLET ORAL 2 TIMES DAILY
COMMUNITY
Start: 2022-07-05

## 2025-01-17 RX ORDER — TRANEXAMIC ACID 650 MG/1
1300 TABLET ORAL 3 TIMES DAILY
Qty: 30 TABLET | Refills: 3 | Status: SHIPPED | OUTPATIENT
Start: 2025-01-17 | End: 2026-01-17

## 2025-01-17 NOTE — PROGRESS NOTES
Subjective   Patient ID: Yanelis Carrillo is a 24 y.o. female who presents for Dysmenorrhea. Pt states her periods are very heavy with large clots, and painful cramping.  HPI  Pt presents for painful periods, periods are mostly monthly, very large clots.  Pt is trying to get pregnant and doesn;t want to try birth control.  Pt also losing hair and more tired.  Cousin had US and had some fibroids or polyps.  Pt would like US is she could.    Review of Systems  all other symptoms were found to be neg except for HPI/CC.      Objective   Physical Exam  General  General Appearance - normal build and Well groomed, Not in acute distress, No acute respiratory distress.  Mental Status - Alert.    Integumentary  - - warm and dry with no rashes.    Head and Neck  - - normalocephalic.    Eye  - - Bilateral - pupils equal and round and sclera clear.    Chest and Lung Exam  - - Bilateral - normal breathing effort.    Musculoskeletal  - - normal posture and normal gait and station.      Assessment/Plan   Diagnoses and all orders for this visit:  Menorrhagia with regular cycle  -     US PELVIS TRANSABDOMINAL WITH TRANSVAGINAL; Future  -     tranexamic acid (Lysteda) 650 mg tablet tablet; Take 2 tablets (1,300 mg) by mouth 3 times a day. Start taking when period starts, 2 pills, three times per day for up to 5 days     Talked about options, Pt will try TXA, will call and let know how she is doing.  If needed can switch to cyclic provera.  Talked about vitamins(PNV and vitmain D3), Pt will start taking.  US ordered for bleeding.  Pt is to F/U in 1 yr for annual exam or PRN.  Pt is to call with any questions of concerns.

## 2025-01-20 ENCOUNTER — HOSPITAL ENCOUNTER (OUTPATIENT)
Dept: RADIOLOGY | Facility: HOSPITAL | Age: 25
Discharge: HOME | End: 2025-01-20
Payer: MEDICAID

## 2025-01-20 DIAGNOSIS — N92.0 MENORRHAGIA WITH REGULAR CYCLE: ICD-10-CM

## 2025-01-20 PROCEDURE — 76830 TRANSVAGINAL US NON-OB: CPT

## 2025-01-20 PROCEDURE — 76856 US EXAM PELVIC COMPLETE: CPT | Performed by: RADIOLOGY

## 2025-01-20 PROCEDURE — 76830 TRANSVAGINAL US NON-OB: CPT | Performed by: RADIOLOGY

## 2025-02-12 DIAGNOSIS — F32.A ANXIETY AND DEPRESSION: ICD-10-CM

## 2025-02-12 DIAGNOSIS — F41.9 ANXIETY AND DEPRESSION: ICD-10-CM

## 2025-02-12 RX ORDER — ESCITALOPRAM OXALATE 20 MG/1
20 TABLET ORAL DAILY
Qty: 90 TABLET | Refills: 0 | Status: SHIPPED | OUTPATIENT
Start: 2025-02-12 | End: 2025-05-13

## 2025-02-27 ENCOUNTER — HOSPITAL ENCOUNTER (OUTPATIENT)
Dept: CARDIOLOGY | Facility: HOSPITAL | Age: 25
Discharge: HOME | End: 2025-02-27
Payer: MEDICAID

## 2025-02-27 DIAGNOSIS — I44.2 ATRIOVENTRICULAR BLOCK, COMPLETE (MULTI): ICD-10-CM

## 2025-02-27 DIAGNOSIS — Z95.0 CARDIAC PACEMAKER IN SITU: ICD-10-CM

## 2025-02-27 PROCEDURE — 93296 REM INTERROG EVL PM/IDS: CPT

## 2025-02-27 PROCEDURE — 93294 REM INTERROG EVL PM/LDLS PM: CPT | Performed by: INTERNAL MEDICINE

## 2025-03-17 ENCOUNTER — APPOINTMENT (OUTPATIENT)
Dept: RADIOLOGY | Facility: HOSPITAL | Age: 25
End: 2025-03-17
Payer: MEDICAID

## 2025-03-17 ENCOUNTER — APPOINTMENT (OUTPATIENT)
Dept: CARDIOLOGY | Facility: HOSPITAL | Age: 25
End: 2025-03-17
Payer: MEDICAID

## 2025-03-17 ENCOUNTER — HOSPITAL ENCOUNTER (EMERGENCY)
Facility: HOSPITAL | Age: 25
Discharge: HOME | End: 2025-03-17
Attending: EMERGENCY MEDICINE
Payer: MEDICAID

## 2025-03-17 VITALS
BODY MASS INDEX: 28.93 KG/M2 | DIASTOLIC BLOOD PRESSURE: 59 MMHG | HEIGHT: 66 IN | TEMPERATURE: 97.7 F | OXYGEN SATURATION: 99 % | SYSTOLIC BLOOD PRESSURE: 119 MMHG | HEART RATE: 73 BPM | WEIGHT: 180 LBS | RESPIRATION RATE: 15 BRPM

## 2025-03-17 DIAGNOSIS — Z86.79 HISTORY OF THIRD DEGREE HEART BLOCK: ICD-10-CM

## 2025-03-17 DIAGNOSIS — Z95.0 PACEMAKER: ICD-10-CM

## 2025-03-17 DIAGNOSIS — R07.9 CHEST PAIN, UNSPECIFIED TYPE: Primary | ICD-10-CM

## 2025-03-17 LAB
ALBUMIN SERPL BCP-MCNC: 3.7 G/DL (ref 3.4–5)
ALP SERPL-CCNC: 75 U/L (ref 33–110)
ALT SERPL W P-5'-P-CCNC: 15 U/L (ref 7–45)
ANION GAP SERPL CALC-SCNC: 11 MMOL/L (ref 10–20)
AST SERPL W P-5'-P-CCNC: 15 U/L (ref 9–39)
B-HCG SERPL-ACNC: <2 MIU/ML
BASOPHILS # BLD AUTO: 0.03 X10*3/UL (ref 0–0.1)
BASOPHILS NFR BLD AUTO: 0.8 %
BILIRUB SERPL-MCNC: 0.2 MG/DL (ref 0–1.2)
BUN SERPL-MCNC: 10 MG/DL (ref 6–23)
CALCIUM SERPL-MCNC: 8.4 MG/DL (ref 8.6–10.3)
CARDIAC TROPONIN I PNL SERPL HS: <3 NG/L (ref 0–13)
CARDIAC TROPONIN I PNL SERPL HS: <3 NG/L (ref 0–13)
CHLORIDE SERPL-SCNC: 106 MMOL/L (ref 98–107)
CO2 SERPL-SCNC: 25 MMOL/L (ref 21–32)
CREAT SERPL-MCNC: 0.89 MG/DL (ref 0.5–1.05)
EGFRCR SERPLBLD CKD-EPI 2021: >90 ML/MIN/1.73M*2
EOSINOPHIL # BLD AUTO: 0.08 X10*3/UL (ref 0–0.7)
EOSINOPHIL NFR BLD AUTO: 2.1 %
ERYTHROCYTE [DISTWIDTH] IN BLOOD BY AUTOMATED COUNT: 18.4 % (ref 11.5–14.5)
GLUCOSE SERPL-MCNC: 102 MG/DL (ref 74–99)
HCT VFR BLD AUTO: 32.7 % (ref 36–46)
HGB BLD-MCNC: 9.1 G/DL (ref 12–16)
HOLD SPECIMEN: NORMAL
IMM GRANULOCYTES # BLD AUTO: 0 X10*3/UL (ref 0–0.7)
IMM GRANULOCYTES NFR BLD AUTO: 0 % (ref 0–0.9)
INR PPP: 1.1 (ref 0.9–1.1)
LYMPHOCYTES # BLD AUTO: 1.11 X10*3/UL (ref 1.2–4.8)
LYMPHOCYTES NFR BLD AUTO: 29.1 %
MCH RBC QN AUTO: 18.8 PG (ref 26–34)
MCHC RBC AUTO-ENTMCNC: 27.8 G/DL (ref 32–36)
MCV RBC AUTO: 67 FL (ref 80–100)
MONOCYTES # BLD AUTO: 0.39 X10*3/UL (ref 0.1–1)
MONOCYTES NFR BLD AUTO: 10.2 %
NEUTROPHILS # BLD AUTO: 2.2 X10*3/UL (ref 1.2–7.7)
NEUTROPHILS NFR BLD AUTO: 57.8 %
NRBC BLD-RTO: 0 /100 WBCS (ref 0–0)
PLATELET # BLD AUTO: 231 X10*3/UL (ref 150–450)
POTASSIUM SERPL-SCNC: 3.5 MMOL/L (ref 3.5–5.3)
PROT SERPL-MCNC: 7 G/DL (ref 6.4–8.2)
PROTHROMBIN TIME: 11.9 SECONDS (ref 9.8–12.4)
RBC # BLD AUTO: 4.85 X10*6/UL (ref 4–5.2)
SODIUM SERPL-SCNC: 138 MMOL/L (ref 136–145)
WBC # BLD AUTO: 3.8 X10*3/UL (ref 4.4–11.3)

## 2025-03-17 PROCEDURE — 85025 COMPLETE CBC W/AUTO DIFF WBC: CPT | Performed by: EMERGENCY MEDICINE

## 2025-03-17 PROCEDURE — 84702 CHORIONIC GONADOTROPIN TEST: CPT | Performed by: PEDIATRICS

## 2025-03-17 PROCEDURE — 84484 ASSAY OF TROPONIN QUANT: CPT | Performed by: PEDIATRICS

## 2025-03-17 PROCEDURE — 36415 COLL VENOUS BLD VENIPUNCTURE: CPT | Performed by: PEDIATRICS

## 2025-03-17 PROCEDURE — 80053 COMPREHEN METABOLIC PANEL: CPT | Performed by: PEDIATRICS

## 2025-03-17 PROCEDURE — 93005 ELECTROCARDIOGRAM TRACING: CPT | Mod: 59

## 2025-03-17 PROCEDURE — 71045 X-RAY EXAM CHEST 1 VIEW: CPT | Performed by: RADIOLOGY

## 2025-03-17 PROCEDURE — 96374 THER/PROPH/DIAG INJ IV PUSH: CPT

## 2025-03-17 PROCEDURE — 85610 PROTHROMBIN TIME: CPT | Performed by: PEDIATRICS

## 2025-03-17 PROCEDURE — 99285 EMERGENCY DEPT VISIT HI MDM: CPT | Mod: 25 | Performed by: EMERGENCY MEDICINE

## 2025-03-17 PROCEDURE — 71045 X-RAY EXAM CHEST 1 VIEW: CPT

## 2025-03-17 PROCEDURE — 84702 CHORIONIC GONADOTROPIN TEST: CPT | Performed by: EMERGENCY MEDICINE

## 2025-03-17 PROCEDURE — 80053 COMPREHEN METABOLIC PANEL: CPT | Performed by: EMERGENCY MEDICINE

## 2025-03-17 PROCEDURE — 84484 ASSAY OF TROPONIN QUANT: CPT | Performed by: EMERGENCY MEDICINE

## 2025-03-17 PROCEDURE — 2500000004 HC RX 250 GENERAL PHARMACY W/ HCPCS (ALT 636 FOR OP/ED)

## 2025-03-17 PROCEDURE — 85610 PROTHROMBIN TIME: CPT | Performed by: EMERGENCY MEDICINE

## 2025-03-17 PROCEDURE — 85025 COMPLETE CBC W/AUTO DIFF WBC: CPT | Performed by: PEDIATRICS

## 2025-03-17 PROCEDURE — 93005 ELECTROCARDIOGRAM TRACING: CPT

## 2025-03-17 RX ORDER — KETOROLAC TROMETHAMINE 15 MG/ML
15 INJECTION, SOLUTION INTRAMUSCULAR; INTRAVENOUS ONCE
Status: COMPLETED | OUTPATIENT
Start: 2025-03-17 | End: 2025-03-17

## 2025-03-17 RX ADMIN — KETOROLAC TROMETHAMINE 15 MG: 15 INJECTION, SOLUTION INTRAMUSCULAR; INTRAVENOUS at 22:04

## 2025-03-17 ASSESSMENT — PAIN DESCRIPTION - PROGRESSION
CLINICAL_PROGRESSION: NOT CHANGED
CLINICAL_PROGRESSION: NOT CHANGED

## 2025-03-17 ASSESSMENT — PAIN SCALES - GENERAL
PAINLEVEL_OUTOF10: 5 - MODERATE PAIN
PAINLEVEL_OUTOF10: 2
PAINLEVEL_OUTOF10: 5 - MODERATE PAIN
PAINLEVEL_OUTOF10: 5 - MODERATE PAIN

## 2025-03-17 ASSESSMENT — HEART SCORE
HEART SCORE: 2
AGE: <45
TROPONIN: LESS THAN OR EQUAL TO NORMAL LIMIT
RISK FACTORS: 1-2 RISK FACTORS
ECG: NORMAL
HISTORY: MODERATELY SUSPICIOUS

## 2025-03-17 ASSESSMENT — PAIN DESCRIPTION - DESCRIPTORS
DESCRIPTORS: ACHING
DESCRIPTORS: DULL
DESCRIPTORS: DULL

## 2025-03-17 ASSESSMENT — PAIN - FUNCTIONAL ASSESSMENT
PAIN_FUNCTIONAL_ASSESSMENT: 0-10

## 2025-03-17 ASSESSMENT — PAIN DESCRIPTION - PAIN TYPE
TYPE: ACUTE PAIN

## 2025-03-17 ASSESSMENT — PAIN DESCRIPTION - FREQUENCY: FREQUENCY: CONSTANT/CONTINUOUS

## 2025-03-17 ASSESSMENT — PAIN DESCRIPTION - ORIENTATION
ORIENTATION: MID
ORIENTATION: MID

## 2025-03-17 ASSESSMENT — PAIN DESCRIPTION - LOCATION
LOCATION: CHEST

## 2025-03-17 ASSESSMENT — PAIN DESCRIPTION - ONSET: ONSET: SUDDEN

## 2025-03-18 LAB
ATRIAL RATE: 65 BPM
ATRIAL RATE: 75 BPM
P AXIS: 28 DEGREES
P AXIS: 34 DEGREES
P OFFSET: 147 MS
P OFFSET: 148 MS
P ONSET: 103 MS
P ONSET: 108 MS
PR INTERVAL: 200 MS
PR INTERVAL: 208 MS
Q ONSET: 207 MS
Q ONSET: 208 MS
QRS COUNT: 11 BEATS
QRS COUNT: 12 BEATS
QRS DURATION: 150 MS
QRS DURATION: 152 MS
QT INTERVAL: 412 MS
QT INTERVAL: 450 MS
QTC CALCULATION(BAZETT): 460 MS
QTC CALCULATION(BAZETT): 468 MS
QTC FREDERICIA: 443 MS
QTC FREDERICIA: 462 MS
R AXIS: 64 DEGREES
R AXIS: 80 DEGREES
T AXIS: 10 DEGREES
T AXIS: 37 DEGREES
T OFFSET: 414 MS
T OFFSET: 432 MS
VENTRICULAR RATE: 65 BPM
VENTRICULAR RATE: 75 BPM
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
## 2025-03-18 NOTE — DISCHARGE INSTRUCTIONS
Please continue to take your medications as prescribed.  I recommend that you use ibuprofen or Aleve as needed for pain.  If pain becomes uncontrollable, you develop respiratory distress, passout, or develop other worrisome symptoms, return to the ER.

## 2025-04-04 ENCOUNTER — APPOINTMENT (OUTPATIENT)
Dept: CARDIOLOGY | Facility: HOSPITAL | Age: 25
End: 2025-04-04
Payer: MEDICAID

## 2025-04-07 ENCOUNTER — APPOINTMENT (OUTPATIENT)
Dept: RADIOLOGY | Facility: HOSPITAL | Age: 25
End: 2025-04-07
Payer: MEDICAID

## 2025-04-07 ENCOUNTER — APPOINTMENT (OUTPATIENT)
Dept: CARDIOLOGY | Facility: HOSPITAL | Age: 25
End: 2025-04-07
Payer: MEDICAID

## 2025-04-07 ENCOUNTER — HOSPITAL ENCOUNTER (OUTPATIENT)
Facility: HOSPITAL | Age: 25
Setting detail: OBSERVATION
Discharge: HOME | End: 2025-04-09
Attending: EMERGENCY MEDICINE | Admitting: STUDENT IN AN ORGANIZED HEALTH CARE EDUCATION/TRAINING PROGRAM
Payer: MEDICAID

## 2025-04-07 DIAGNOSIS — D50.9 IRON DEFICIENCY ANEMIA, UNSPECIFIED IRON DEFICIENCY ANEMIA TYPE: ICD-10-CM

## 2025-04-07 DIAGNOSIS — K81.9 ACALCULOUS CHOLECYSTITIS: Primary | ICD-10-CM

## 2025-04-07 DIAGNOSIS — R74.01 TRANSAMINITIS: ICD-10-CM

## 2025-04-07 LAB
ALBUMIN SERPL BCP-MCNC: 3.1 G/DL (ref 3.4–5)
ALP SERPL-CCNC: 252 U/L (ref 33–110)
ALT SERPL W P-5'-P-CCNC: 228 U/L (ref 7–45)
ANION GAP SERPL CALC-SCNC: 13 MMOL/L (ref 10–20)
APPEARANCE UR: CLEAR
AST SERPL W P-5'-P-CCNC: 268 U/L (ref 9–39)
BASOPHILS # BLD MANUAL: 0 X10*3/UL (ref 0–0.1)
BASOPHILS NFR BLD MANUAL: 0 %
BILIRUB SERPL-MCNC: 1.5 MG/DL (ref 0–1.2)
BILIRUB UR STRIP.AUTO-MCNC: ABNORMAL MG/DL
BUN SERPL-MCNC: 9 MG/DL (ref 6–23)
CALCIUM SERPL-MCNC: 8.6 MG/DL (ref 8.6–10.3)
CHLORIDE SERPL-SCNC: 101 MMOL/L (ref 98–107)
CO2 SERPL-SCNC: 23 MMOL/L (ref 21–32)
COLOR UR: ABNORMAL
CREAT SERPL-MCNC: 0.83 MG/DL (ref 0.5–1.05)
EGFRCR SERPLBLD CKD-EPI 2021: >90 ML/MIN/1.73M*2
EOSINOPHIL # BLD MANUAL: 0 X10*3/UL (ref 0–0.7)
EOSINOPHIL NFR BLD MANUAL: 0 %
ERYTHROCYTE [DISTWIDTH] IN BLOOD BY AUTOMATED COUNT: 19.9 % (ref 11.5–14.5)
FLUAV RNA RESP QL NAA+PROBE: NOT DETECTED
FLUBV RNA RESP QL NAA+PROBE: NOT DETECTED
GIANT PLATELETS BLD QL SMEAR: ABNORMAL
GLUCOSE SERPL-MCNC: 104 MG/DL (ref 74–99)
GLUCOSE UR STRIP.AUTO-MCNC: NORMAL MG/DL
HCG UR QL IA.RAPID: NEGATIVE
HCT VFR BLD AUTO: 28.9 % (ref 36–46)
HGB BLD-MCNC: 8.3 G/DL (ref 12–16)
IMM GRANULOCYTES # BLD AUTO: 0.01 X10*3/UL (ref 0–0.7)
IMM GRANULOCYTES NFR BLD AUTO: 0.1 % (ref 0–0.9)
KETONES UR STRIP.AUTO-MCNC: ABNORMAL MG/DL
LEUKOCYTE ESTERASE UR QL STRIP.AUTO: NEGATIVE
LIPASE SERPL-CCNC: 6 U/L (ref 9–82)
LYMPHOCYTES # BLD MANUAL: 3.42 X10*3/UL (ref 1.2–4.8)
LYMPHOCYTES NFR BLD MANUAL: 51 %
MAGNESIUM SERPL-MCNC: 1.9 MG/DL (ref 1.6–2.4)
MCH RBC QN AUTO: 19.1 PG (ref 26–34)
MCHC RBC AUTO-ENTMCNC: 28.7 G/DL (ref 32–36)
MCV RBC AUTO: 67 FL (ref 80–100)
MONOCYTES # BLD MANUAL: 0.13 X10*3/UL (ref 0.1–1)
MONOCYTES NFR BLD MANUAL: 2 %
MUCOUS THREADS #/AREA URNS AUTO: NORMAL /LPF
NEUTROPHILS # BLD MANUAL: 1.61 X10*3/UL (ref 1.2–7.7)
NEUTS BAND # BLD MANUAL: 0.07 X10*3/UL (ref 0–0.7)
NEUTS BAND NFR BLD MANUAL: 1 %
NEUTS SEG # BLD MANUAL: 1.54 X10*3/UL (ref 1.2–7)
NEUTS SEG NFR BLD MANUAL: 23 %
NITRITE UR QL STRIP.AUTO: NEGATIVE
NRBC BLD-RTO: 0.3 /100 WBCS (ref 0–0)
OVALOCYTES BLD QL SMEAR: ABNORMAL
PH UR STRIP.AUTO: 6 [PH]
PLATELET # BLD AUTO: 101 X10*3/UL (ref 150–450)
POTASSIUM SERPL-SCNC: 3.3 MMOL/L (ref 3.5–5.3)
PROT SERPL-MCNC: 6.5 G/DL (ref 6.4–8.2)
PROT UR STRIP.AUTO-MCNC: ABNORMAL MG/DL
RBC # BLD AUTO: 4.34 X10*6/UL (ref 4–5.2)
RBC # UR STRIP.AUTO: NEGATIVE MG/DL
RBC #/AREA URNS AUTO: NORMAL /HPF
RBC MORPH BLD: ABNORMAL
SARS-COV-2 RNA RESP QL NAA+PROBE: NOT DETECTED
SODIUM SERPL-SCNC: 134 MMOL/L (ref 136–145)
SP GR UR STRIP.AUTO: 1.03
SQUAMOUS #/AREA URNS AUTO: NORMAL /HPF
TOTAL CELLS COUNTED BLD: 100
UROBILINOGEN UR STRIP.AUTO-MCNC: ABNORMAL MG/DL
VARIANT LYMPHS # BLD MANUAL: 1.54 X10*3/UL (ref 0–0.5)
VARIANT LYMPHS NFR BLD: 23 %
WBC # BLD AUTO: 6.7 X10*3/UL (ref 4.4–11.3)
WBC #/AREA URNS AUTO: NORMAL /HPF

## 2025-04-07 PROCEDURE — 85007 BL SMEAR W/DIFF WBC COUNT: CPT

## 2025-04-07 PROCEDURE — 87636 SARSCOV2 & INF A&B AMP PRB: CPT | Performed by: EMERGENCY MEDICINE

## 2025-04-07 PROCEDURE — 99285 EMERGENCY DEPT VISIT HI MDM: CPT | Mod: 25 | Performed by: EMERGENCY MEDICINE

## 2025-04-07 PROCEDURE — 80143 DRUG ASSAY ACETAMINOPHEN: CPT | Performed by: STUDENT IN AN ORGANIZED HEALTH CARE EDUCATION/TRAINING PROGRAM

## 2025-04-07 PROCEDURE — 2500000001 HC RX 250 WO HCPCS SELF ADMINISTERED DRUGS (ALT 637 FOR MEDICARE OP)

## 2025-04-07 PROCEDURE — 36415 COLL VENOUS BLD VENIPUNCTURE: CPT

## 2025-04-07 PROCEDURE — 96375 TX/PRO/DX INJ NEW DRUG ADDON: CPT | Mod: 59

## 2025-04-07 PROCEDURE — 82728 ASSAY OF FERRITIN: CPT

## 2025-04-07 PROCEDURE — 2550000001 HC RX 255 CONTRASTS: Performed by: EMERGENCY MEDICINE

## 2025-04-07 PROCEDURE — 81001 URINALYSIS AUTO W/SCOPE: CPT

## 2025-04-07 PROCEDURE — 81025 URINE PREGNANCY TEST: CPT

## 2025-04-07 PROCEDURE — 93005 ELECTROCARDIOGRAM TRACING: CPT

## 2025-04-07 PROCEDURE — 71275 CT ANGIOGRAPHY CHEST: CPT

## 2025-04-07 PROCEDURE — 74177 CT ABD & PELVIS W/CONTRAST: CPT

## 2025-04-07 PROCEDURE — 83690 ASSAY OF LIPASE: CPT

## 2025-04-07 PROCEDURE — 85045 AUTOMATED RETICULOCYTE COUNT: CPT

## 2025-04-07 PROCEDURE — 85027 COMPLETE CBC AUTOMATED: CPT

## 2025-04-07 PROCEDURE — 83735 ASSAY OF MAGNESIUM: CPT

## 2025-04-07 PROCEDURE — 83550 IRON BINDING TEST: CPT

## 2025-04-07 PROCEDURE — 80053 COMPREHEN METABOLIC PANEL: CPT

## 2025-04-07 PROCEDURE — 2500000004 HC RX 250 GENERAL PHARMACY W/ HCPCS (ALT 636 FOR OP/ED)

## 2025-04-07 RX ORDER — POTASSIUM CHLORIDE 1.5 G/1.58G
20 POWDER, FOR SOLUTION ORAL ONCE
Status: COMPLETED | OUTPATIENT
Start: 2025-04-07 | End: 2025-04-07

## 2025-04-07 RX ORDER — ONDANSETRON HYDROCHLORIDE 2 MG/ML
4 INJECTION, SOLUTION INTRAVENOUS ONCE
Status: COMPLETED | OUTPATIENT
Start: 2025-04-07 | End: 2025-04-07

## 2025-04-07 RX ADMIN — POTASSIUM CHLORIDE 20 MEQ: 1.5 POWDER, FOR SOLUTION ORAL at 23:01

## 2025-04-07 RX ADMIN — IOHEXOL 75 ML: 350 INJECTION, SOLUTION INTRAVENOUS at 22:41

## 2025-04-07 RX ADMIN — ONDANSETRON 4 MG: 2 INJECTION INTRAMUSCULAR; INTRAVENOUS at 21:35

## 2025-04-07 ASSESSMENT — COLUMBIA-SUICIDE SEVERITY RATING SCALE - C-SSRS
2. HAVE YOU ACTUALLY HAD ANY THOUGHTS OF KILLING YOURSELF?: NO
1. IN THE PAST MONTH, HAVE YOU WISHED YOU WERE DEAD OR WISHED YOU COULD GO TO SLEEP AND NOT WAKE UP?: NO
6. HAVE YOU EVER DONE ANYTHING, STARTED TO DO ANYTHING, OR PREPARED TO DO ANYTHING TO END YOUR LIFE?: NO

## 2025-04-07 ASSESSMENT — LIFESTYLE VARIABLES
HAVE PEOPLE ANNOYED YOU BY CRITICIZING YOUR DRINKING: NO
HAVE YOU EVER FELT YOU SHOULD CUT DOWN ON YOUR DRINKING: NO
EVER HAD A DRINK FIRST THING IN THE MORNING TO STEADY YOUR NERVES TO GET RID OF A HANGOVER: NO
TOTAL SCORE: 0
EVER FELT BAD OR GUILTY ABOUT YOUR DRINKING: NO

## 2025-04-08 ENCOUNTER — APPOINTMENT (OUTPATIENT)
Dept: RADIOLOGY | Facility: HOSPITAL | Age: 25
End: 2025-04-08
Payer: MEDICAID

## 2025-04-08 ENCOUNTER — APPOINTMENT (OUTPATIENT)
Dept: CARDIOLOGY | Facility: HOSPITAL | Age: 25
End: 2025-04-08
Payer: MEDICAID

## 2025-04-08 PROBLEM — O99.019 ANEMIA OF PREGNANCY: Status: RESOLVED | Noted: 2025-04-08 | Resolved: 2025-04-08

## 2025-04-08 PROBLEM — N89.8 VAGINAL ODOR: Status: RESOLVED | Noted: 2025-04-08 | Resolved: 2025-04-08

## 2025-04-08 PROBLEM — B27.00 GAMMAHERPESVIRAL MONONUCLEOSIS WITHOUT COMPLICATION: Status: ACTIVE | Noted: 2025-04-08

## 2025-04-08 PROBLEM — R16.1 SPLENOMEGALY: Status: ACTIVE | Noted: 2025-04-08

## 2025-04-08 PROBLEM — O26.859 SPOTTING DURING PREGNANCY (HHS-HCC): Status: RESOLVED | Noted: 2025-04-08 | Resolved: 2025-04-08

## 2025-04-08 PROBLEM — O99.019 ANEMIA IN PREGNANCY: Status: RESOLVED | Noted: 2025-04-08 | Resolved: 2025-04-08

## 2025-04-08 PROBLEM — R06.02 SHORTNESS OF BREATH: Status: RESOLVED | Noted: 2025-04-08 | Resolved: 2025-04-08

## 2025-04-08 PROBLEM — N89.8 VAGINAL ITCHING: Status: RESOLVED | Noted: 2025-04-08 | Resolved: 2025-04-08

## 2025-04-08 PROBLEM — M25.579 ANKLE PAIN: Status: RESOLVED | Noted: 2025-04-08 | Resolved: 2025-04-08

## 2025-04-08 PROBLEM — B37.9 YEAST INFECTION: Status: RESOLVED | Noted: 2025-04-08 | Resolved: 2025-04-08

## 2025-04-08 PROBLEM — N89.8 PRURITUS OF VAGINA: Status: RESOLVED | Noted: 2025-04-08 | Resolved: 2025-04-08

## 2025-04-08 PROBLEM — R60.9 EDEMA: Status: RESOLVED | Noted: 2025-04-08 | Resolved: 2025-04-08

## 2025-04-08 PROBLEM — N89.8 VAGINAL DISCHARGE: Status: RESOLVED | Noted: 2025-04-08 | Resolved: 2025-04-08

## 2025-04-08 PROBLEM — K81.9 ACALCULOUS CHOLECYSTITIS: Status: ACTIVE | Noted: 2025-04-08

## 2025-04-08 PROBLEM — M79.605 PAIN OF LEFT LOWER EXTREMITY: Status: RESOLVED | Noted: 2025-04-08 | Resolved: 2025-04-08

## 2025-04-08 PROBLEM — B49 INFECTION DUE TO FUNGUS: Status: RESOLVED | Noted: 2025-04-08 | Resolved: 2025-04-08

## 2025-04-08 PROBLEM — K81.9 ACALCULOUS CHOLECYSTITIS: Status: RESOLVED | Noted: 2025-04-08 | Resolved: 2025-04-08

## 2025-04-08 PROBLEM — N76.0 BACTERIAL VAGINOSIS: Status: RESOLVED | Noted: 2025-04-08 | Resolved: 2025-04-08

## 2025-04-08 PROBLEM — D50.9 IRON DEFICIENCY ANEMIA: Status: ACTIVE | Noted: 2025-04-08

## 2025-04-08 PROBLEM — O26.859 SPOTTING IN EARLY PREGNANCY (HHS-HCC): Status: RESOLVED | Noted: 2025-04-08 | Resolved: 2025-04-08

## 2025-04-08 PROBLEM — F41.9 ANXIETY: Status: ACTIVE | Noted: 2025-04-08

## 2025-04-08 PROBLEM — B96.89 BACTERIAL VAGINOSIS: Status: RESOLVED | Noted: 2025-04-08 | Resolved: 2025-04-08

## 2025-04-08 PROBLEM — R74.01 TRANSAMINITIS: Status: ACTIVE | Noted: 2025-04-08

## 2025-04-08 PROBLEM — D69.6 THROMBOCYTOPENIA (CMS-HCC): Status: ACTIVE | Noted: 2025-04-08

## 2025-04-08 PROBLEM — R35.0 INCREASED URINARY FREQUENCY: Status: RESOLVED | Noted: 2025-04-08 | Resolved: 2025-04-08

## 2025-04-08 LAB
ABO GROUP (TYPE) IN BLOOD: NORMAL
ALBUMIN SERPL BCP-MCNC: 2.7 G/DL (ref 3.4–5)
ALP SERPL-CCNC: 234 U/L (ref 33–110)
ALT SERPL W P-5'-P-CCNC: 201 U/L (ref 7–45)
ANION GAP SERPL CALC-SCNC: 10 MMOL/L (ref 10–20)
ANTIBODY SCREEN: NORMAL
APAP SERPL-MCNC: <10 UG/ML
APTT PPP: 32 SECONDS (ref 26–36)
AST SERPL W P-5'-P-CCNC: 205 U/L (ref 9–39)
ATRIAL RATE: 88 BPM
BASOPHILS # BLD AUTO: 0.03 X10*3/UL (ref 0–0.1)
BASOPHILS NFR BLD AUTO: 0.6 %
BILIRUB DIRECT SERPL-MCNC: 0.5 MG/DL (ref 0–0.3)
BILIRUB SERPL-MCNC: 0.9 MG/DL (ref 0–1.2)
BUN SERPL-MCNC: 7 MG/DL (ref 6–23)
CALCIUM SERPL-MCNC: 7.7 MG/DL (ref 8.6–10.3)
CARDIAC TROPONIN I PNL SERPL HS: 7 NG/L (ref 0–13)
CHLORIDE SERPL-SCNC: 106 MMOL/L (ref 98–107)
CO2 SERPL-SCNC: 25 MMOL/L (ref 21–32)
CREAT SERPL-MCNC: 0.8 MG/DL (ref 0.5–1.05)
DACRYOCYTES BLD QL SMEAR: NORMAL
EBV EA IGG SER QL: NEGATIVE
EBV NA AB SER QL: NEGATIVE
EBV VCA IGG SER IA-ACNC: NEGATIVE
EBV VCA IGM SER IA-ACNC: POSITIVE
EGFRCR SERPLBLD CKD-EPI 2021: >90 ML/MIN/1.73M*2
EOSINOPHIL # BLD AUTO: 0 X10*3/UL (ref 0–0.7)
EOSINOPHIL NFR BLD AUTO: 0 %
ERYTHROCYTE [DISTWIDTH] IN BLOOD BY AUTOMATED COUNT: 20 % (ref 11.5–14.5)
ERYTHROCYTE [DISTWIDTH] IN BLOOD BY AUTOMATED COUNT: 20 % (ref 11.5–14.5)
FERRITIN SERPL-MCNC: 74 NG/ML (ref 8–150)
GLUCOSE SERPL-MCNC: 94 MG/DL (ref 74–99)
HCT VFR BLD AUTO: 27.4 % (ref 36–46)
HCT VFR BLD AUTO: 27.4 % (ref 36–46)
HETEROPH AB SERPLBLD QL IA.RAPID: POSITIVE
HGB BLD-MCNC: 7.8 G/DL (ref 12–16)
HGB BLD-MCNC: 7.8 G/DL (ref 12–16)
HGB RETIC QN: 20 PG (ref 28–38)
HOLD SPECIMEN: NORMAL
HYPOCHROMIA BLD QL SMEAR: NORMAL
IMM GRANULOCYTES # BLD AUTO: 0.01 X10*3/UL (ref 0–0.7)
IMM GRANULOCYTES NFR BLD AUTO: 0.2 % (ref 0–0.9)
IMMATURE RETIC FRACTION: 25.8 %
INR PPP: 1.4 (ref 0.9–1.1)
IRON SATN MFR SERPL: 4 % (ref 25–45)
IRON SERPL-MCNC: 17 UG/DL (ref 35–150)
LYMPHOCYTES # BLD AUTO: 4.02 X10*3/UL (ref 1.2–4.8)
LYMPHOCYTES NFR BLD AUTO: 75.4 %
MCH RBC QN AUTO: 19 PG (ref 26–34)
MCH RBC QN AUTO: 19 PG (ref 26–34)
MCHC RBC AUTO-ENTMCNC: 28.5 G/DL (ref 32–36)
MCHC RBC AUTO-ENTMCNC: 28.5 G/DL (ref 32–36)
MCV RBC AUTO: 67 FL (ref 80–100)
MCV RBC AUTO: 67 FL (ref 80–100)
MONOCYTES # BLD AUTO: 0.23 X10*3/UL (ref 0.1–1)
MONOCYTES NFR BLD AUTO: 4.3 %
NEUTROPHILS # BLD AUTO: 1.04 X10*3/UL (ref 1.2–7.7)
NEUTROPHILS NFR BLD AUTO: 19.5 %
NRBC BLD-RTO: 0 /100 WBCS (ref 0–0)
NRBC BLD-RTO: 0 /100 WBCS (ref 0–0)
OVALOCYTES BLD QL SMEAR: NORMAL
P AXIS: 32 DEGREES
P OFFSET: 148 MS
P ONSET: 109 MS
PATH REVIEW-CBC DIFFERENTIAL: NORMAL
PATH REVIEW-CBC DIFFERENTIAL: NORMAL
PHOSPHATE SERPL-MCNC: 3.7 MG/DL (ref 2.5–4.9)
PLATELET # BLD AUTO: 94 X10*3/UL (ref 150–450)
PLATELET # BLD AUTO: 94 X10*3/UL (ref 150–450)
POLYCHROMASIA BLD QL SMEAR: NORMAL
POTASSIUM SERPL-SCNC: 3.5 MMOL/L (ref 3.5–5.3)
PR INTERVAL: 198 MS
PROT SERPL-MCNC: 5.8 G/DL (ref 6.4–8.2)
PROTHROMBIN TIME: 15 SECONDS (ref 9.8–12.4)
Q ONSET: 208 MS
QRS COUNT: 15 BEATS
QRS DURATION: 148 MS
QT INTERVAL: 396 MS
QTC CALCULATION(BAZETT): 479 MS
QTC FREDERICIA: 450 MS
R AXIS: 81 DEGREES
RBC # BLD AUTO: 4.11 X10*6/UL (ref 4–5.2)
RBC # BLD AUTO: 4.11 X10*6/UL (ref 4–5.2)
RBC MORPH BLD: NORMAL
RETICS #: 0.07 X10*6/UL (ref 0.02–0.08)
RETICS/RBC NFR AUTO: 1.6 % (ref 0.5–2)
RH FACTOR (ANTIGEN D): NORMAL
SODIUM SERPL-SCNC: 137 MMOL/L (ref 136–145)
T AXIS: 50 DEGREES
T OFFSET: 406 MS
TIBC SERPL-MCNC: 404 UG/DL (ref 240–445)
TRANSFERRIN SERPL-MCNC: 295 MG/DL (ref 200–360)
UIBC SERPL-MCNC: 387 UG/DL (ref 110–370)
VENTRICULAR RATE: 88 BPM
WBC # BLD AUTO: 5.4 X10*3/UL (ref 4.4–11.3)
WBC # BLD AUTO: 5.4 X10*3/UL (ref 4.4–11.3)

## 2025-04-08 PROCEDURE — 82248 BILIRUBIN DIRECT: CPT

## 2025-04-08 PROCEDURE — A9537 TC99M MEBROFENIN: HCPCS | Performed by: STUDENT IN AN ORGANIZED HEALTH CARE EDUCATION/TRAINING PROGRAM

## 2025-04-08 PROCEDURE — 84466 ASSAY OF TRANSFERRIN: CPT | Mod: STJLAB

## 2025-04-08 PROCEDURE — 78226 HEPATOBILIARY SYSTEM IMAGING: CPT

## 2025-04-08 PROCEDURE — G0378 HOSPITAL OBSERVATION PER HR: HCPCS

## 2025-04-08 PROCEDURE — 76705 ECHO EXAM OF ABDOMEN: CPT

## 2025-04-08 PROCEDURE — 2500000004 HC RX 250 GENERAL PHARMACY W/ HCPCS (ALT 636 FOR OP/ED): Performed by: STUDENT IN AN ORGANIZED HEALTH CARE EDUCATION/TRAINING PROGRAM

## 2025-04-08 PROCEDURE — 36415 COLL VENOUS BLD VENIPUNCTURE: CPT | Performed by: STUDENT IN AN ORGANIZED HEALTH CARE EDUCATION/TRAINING PROGRAM

## 2025-04-08 PROCEDURE — 2500000004 HC RX 250 GENERAL PHARMACY W/ HCPCS (ALT 636 FOR OP/ED)

## 2025-04-08 PROCEDURE — 80053 COMPREHEN METABOLIC PANEL: CPT

## 2025-04-08 PROCEDURE — 76705 ECHO EXAM OF ABDOMEN: CPT | Mod: FOREIGN READ | Performed by: RADIOLOGY

## 2025-04-08 PROCEDURE — 85025 COMPLETE CBC W/AUTO DIFF WBC: CPT

## 2025-04-08 PROCEDURE — 85730 THROMBOPLASTIN TIME PARTIAL: CPT | Performed by: STUDENT IN AN ORGANIZED HEALTH CARE EDUCATION/TRAINING PROGRAM

## 2025-04-08 PROCEDURE — 96375 TX/PRO/DX INJ NEW DRUG ADDON: CPT | Mod: 59

## 2025-04-08 PROCEDURE — 2500000001 HC RX 250 WO HCPCS SELF ADMINISTERED DRUGS (ALT 637 FOR MEDICARE OP)

## 2025-04-08 PROCEDURE — 86308 HETEROPHILE ANTIBODY SCREEN: CPT | Performed by: STUDENT IN AN ORGANIZED HEALTH CARE EDUCATION/TRAINING PROGRAM

## 2025-04-08 PROCEDURE — 86901 BLOOD TYPING SEROLOGIC RH(D): CPT | Performed by: STUDENT IN AN ORGANIZED HEALTH CARE EDUCATION/TRAINING PROGRAM

## 2025-04-08 PROCEDURE — 96366 THER/PROPH/DIAG IV INF ADDON: CPT

## 2025-04-08 PROCEDURE — 3430000001 HC RX 343 DIAGNOSTIC RADIOPHARMACEUTICALS: Performed by: STUDENT IN AN ORGANIZED HEALTH CARE EDUCATION/TRAINING PROGRAM

## 2025-04-08 PROCEDURE — 99222 1ST HOSP IP/OBS MODERATE 55: CPT

## 2025-04-08 PROCEDURE — 84100 ASSAY OF PHOSPHORUS: CPT

## 2025-04-08 PROCEDURE — 36415 COLL VENOUS BLD VENIPUNCTURE: CPT

## 2025-04-08 PROCEDURE — 2500000002 HC RX 250 W HCPCS SELF ADMINISTERED DRUGS (ALT 637 FOR MEDICARE OP, ALT 636 FOR OP/ED)

## 2025-04-08 PROCEDURE — 93005 ELECTROCARDIOGRAM TRACING: CPT | Mod: 59

## 2025-04-08 PROCEDURE — 96361 HYDRATE IV INFUSION ADD-ON: CPT

## 2025-04-08 PROCEDURE — 86663 EPSTEIN-BARR ANTIBODY: CPT | Mod: STJLAB | Performed by: STUDENT IN AN ORGANIZED HEALTH CARE EDUCATION/TRAINING PROGRAM

## 2025-04-08 PROCEDURE — 93005 ELECTROCARDIOGRAM TRACING: CPT

## 2025-04-08 PROCEDURE — 96365 THER/PROPH/DIAG IV INF INIT: CPT | Mod: 59

## 2025-04-08 PROCEDURE — 84484 ASSAY OF TROPONIN QUANT: CPT

## 2025-04-08 RX ORDER — ERTAPENEM 1 G/1
1 INJECTION, POWDER, LYOPHILIZED, FOR SOLUTION INTRAMUSCULAR; INTRAVENOUS EVERY 24 HOURS
Status: DISCONTINUED | OUTPATIENT
Start: 2025-04-09 | End: 2025-04-08

## 2025-04-08 RX ORDER — AZITHROMYCIN 250 MG/1
250 TABLET, FILM COATED ORAL DAILY
COMMUNITY
Start: 2025-04-06 | End: 2025-04-09 | Stop reason: HOSPADM

## 2025-04-08 RX ORDER — LANOLIN ALCOHOL/MO/W.PET/CERES
400 CREAM (GRAM) TOPICAL ONCE
Status: COMPLETED | OUTPATIENT
Start: 2025-04-08 | End: 2025-04-08

## 2025-04-08 RX ORDER — POLYETHYLENE GLYCOL 3350 17 G/17G
17 POWDER, FOR SOLUTION ORAL DAILY
Status: DISCONTINUED | OUTPATIENT
Start: 2025-04-08 | End: 2025-04-09 | Stop reason: HOSPADM

## 2025-04-08 RX ORDER — ESCITALOPRAM OXALATE 20 MG/1
20 TABLET ORAL NIGHTLY
Status: DISCONTINUED | OUTPATIENT
Start: 2025-04-08 | End: 2025-04-09 | Stop reason: HOSPADM

## 2025-04-08 RX ORDER — POTASSIUM CHLORIDE 20 MEQ/1
20 TABLET, EXTENDED RELEASE ORAL ONCE
Status: COMPLETED | OUTPATIENT
Start: 2025-04-08 | End: 2025-04-08

## 2025-04-08 RX ORDER — KIT FOR THE PREPARATION OF TECHNETIUM TC 99M MEBROFENIN 45 MG/10ML
4.8 INJECTION, POWDER, LYOPHILIZED, FOR SOLUTION INTRAVENOUS
Status: COMPLETED | OUTPATIENT
Start: 2025-04-08 | End: 2025-04-08

## 2025-04-08 RX ORDER — SODIUM CHLORIDE, SODIUM LACTATE, POTASSIUM CHLORIDE, CALCIUM CHLORIDE 600; 310; 30; 20 MG/100ML; MG/100ML; MG/100ML; MG/100ML
100 INJECTION, SOLUTION INTRAVENOUS CONTINUOUS
Status: DISCONTINUED | OUTPATIENT
Start: 2025-04-08 | End: 2025-04-08

## 2025-04-08 RX ORDER — ACETAMINOPHEN 325 MG/1
975 TABLET ORAL ONCE
Status: COMPLETED | OUTPATIENT
Start: 2025-04-08 | End: 2025-04-08

## 2025-04-08 RX ORDER — ERTAPENEM 1 G/1
1 INJECTION, POWDER, LYOPHILIZED, FOR SOLUTION INTRAMUSCULAR; INTRAVENOUS ONCE
Status: COMPLETED | OUTPATIENT
Start: 2025-04-08 | End: 2025-04-08

## 2025-04-08 RX ORDER — ACETAMINOPHEN 325 MG/1
500 TABLET ORAL ONCE
Status: COMPLETED | OUTPATIENT
Start: 2025-04-08 | End: 2025-04-08

## 2025-04-08 RX ADMIN — ACETAMINOPHEN 975 MG: 325 TABLET, FILM COATED ORAL at 03:27

## 2025-04-08 RX ADMIN — ACETAMINOPHEN 487.5 MG: 325 TABLET ORAL at 20:17

## 2025-04-08 RX ADMIN — SODIUM CHLORIDE 1000 ML: 9 INJECTION, SOLUTION INTRAVENOUS at 01:52

## 2025-04-08 RX ADMIN — Medication 400 MG: at 06:51

## 2025-04-08 RX ADMIN — POTASSIUM CHLORIDE 20 MEQ: 1500 TABLET, EXTENDED RELEASE ORAL at 06:51

## 2025-04-08 RX ADMIN — KIT FOR THE PREPARATION OF TECHNETIUM TC 99M MEBROFENIN 4.8 MILLICURIE: 45 INJECTION, POWDER, LYOPHILIZED, FOR SOLUTION INTRAVENOUS at 13:20

## 2025-04-08 RX ADMIN — IRON SUCROSE 200 MG: 20 INJECTION, SOLUTION INTRAVENOUS at 10:07

## 2025-04-08 RX ADMIN — ESCITALOPRAM OXALATE 20 MG: 20 TABLET ORAL at 20:17

## 2025-04-08 RX ADMIN — ERTAPENEM SODIUM 1 G: 1 INJECTION, POWDER, LYOPHILIZED, FOR SOLUTION INTRAMUSCULAR; INTRAVENOUS at 04:20

## 2025-04-08 RX ADMIN — SODIUM CHLORIDE, SODIUM LACTATE, POTASSIUM CHLORIDE, AND CALCIUM CHLORIDE 100 ML/HR: .6; .31; .03; .02 INJECTION, SOLUTION INTRAVENOUS at 08:55

## 2025-04-08 SDOH — SOCIAL STABILITY: SOCIAL INSECURITY: ARE THERE ANY APPARENT SIGNS OF INJURIES/BEHAVIORS THAT COULD BE RELATED TO ABUSE/NEGLECT?: NO

## 2025-04-08 SDOH — SOCIAL STABILITY: SOCIAL INSECURITY: WITHIN THE LAST YEAR, HAVE YOU BEEN HUMILIATED OR EMOTIONALLY ABUSED IN OTHER WAYS BY YOUR PARTNER OR EX-PARTNER?: NO

## 2025-04-08 SDOH — ECONOMIC STABILITY: HOUSING INSECURITY: AT ANY TIME IN THE PAST 12 MONTHS, WERE YOU HOMELESS OR LIVING IN A SHELTER (INCLUDING NOW)?: NO

## 2025-04-08 SDOH — SOCIAL STABILITY: SOCIAL INSECURITY: DO YOU FEEL ANYONE HAS EXPLOITED OR TAKEN ADVANTAGE OF YOU FINANCIALLY OR OF YOUR PERSONAL PROPERTY?: NO

## 2025-04-08 SDOH — ECONOMIC STABILITY: FOOD INSECURITY: WITHIN THE PAST 12 MONTHS, YOU WORRIED THAT YOUR FOOD WOULD RUN OUT BEFORE YOU GOT THE MONEY TO BUY MORE.: NEVER TRUE

## 2025-04-08 SDOH — ECONOMIC STABILITY: FOOD INSECURITY: WITHIN THE PAST 12 MONTHS, THE FOOD YOU BOUGHT JUST DIDN'T LAST AND YOU DIDN'T HAVE MONEY TO GET MORE.: NEVER TRUE

## 2025-04-08 SDOH — SOCIAL STABILITY: SOCIAL INSECURITY: DOES ANYONE TRY TO KEEP YOU FROM HAVING/CONTACTING OTHER FRIENDS OR DOING THINGS OUTSIDE YOUR HOME?: NO

## 2025-04-08 SDOH — SOCIAL STABILITY: SOCIAL INSECURITY
WITHIN THE LAST YEAR, HAVE YOU BEEN KICKED, HIT, SLAPPED, OR OTHERWISE PHYSICALLY HURT BY YOUR PARTNER OR EX-PARTNER?: NO

## 2025-04-08 SDOH — SOCIAL STABILITY: SOCIAL INSECURITY: DO YOU FEEL UNSAFE GOING BACK TO THE PLACE WHERE YOU ARE LIVING?: NO

## 2025-04-08 SDOH — SOCIAL STABILITY: SOCIAL INSECURITY: HAS ANYONE EVER THREATENED TO HURT YOUR FAMILY OR YOUR PETS?: NO

## 2025-04-08 SDOH — SOCIAL STABILITY: SOCIAL INSECURITY: HAVE YOU HAD THOUGHTS OF HARMING ANYONE ELSE?: NO

## 2025-04-08 SDOH — SOCIAL STABILITY: SOCIAL INSECURITY: ARE YOU OR HAVE YOU BEEN THREATENED OR ABUSED PHYSICALLY, EMOTIONALLY, OR SEXUALLY BY ANYONE?: NO

## 2025-04-08 SDOH — ECONOMIC STABILITY: TRANSPORTATION INSECURITY: IN THE PAST 12 MONTHS, HAS LACK OF TRANSPORTATION KEPT YOU FROM MEDICAL APPOINTMENTS OR FROM GETTING MEDICATIONS?: NO

## 2025-04-08 SDOH — SOCIAL STABILITY: SOCIAL INSECURITY: ABUSE: ADULT

## 2025-04-08 SDOH — SOCIAL STABILITY: SOCIAL INSECURITY
WITHIN THE LAST YEAR, HAVE YOU BEEN RAPED OR FORCED TO HAVE ANY KIND OF SEXUAL ACTIVITY BY YOUR PARTNER OR EX-PARTNER?: NO

## 2025-04-08 SDOH — SOCIAL STABILITY: SOCIAL INSECURITY: WITHIN THE LAST YEAR, HAVE YOU BEEN AFRAID OF YOUR PARTNER OR EX-PARTNER?: NO

## 2025-04-08 SDOH — ECONOMIC STABILITY: INCOME INSECURITY: IN THE PAST 12 MONTHS HAS THE ELECTRIC, GAS, OIL, OR WATER COMPANY THREATENED TO SHUT OFF SERVICES IN YOUR HOME?: NO

## 2025-04-08 SDOH — SOCIAL STABILITY: SOCIAL INSECURITY: HAVE YOU HAD ANY THOUGHTS OF HARMING ANYONE ELSE?: NO

## 2025-04-08 SDOH — SOCIAL STABILITY: SOCIAL INSECURITY: WERE YOU ABLE TO COMPLETE ALL THE BEHAVIORAL HEALTH SCREENINGS?: YES

## 2025-04-08 ASSESSMENT — PAIN DESCRIPTION - LOCATION: LOCATION: CHEST

## 2025-04-08 ASSESSMENT — COGNITIVE AND FUNCTIONAL STATUS - GENERAL
PATIENT BASELINE BEDBOUND: NO
MOBILITY SCORE: 24
DAILY ACTIVITIY SCORE: 24

## 2025-04-08 ASSESSMENT — LIFESTYLE VARIABLES
SKIP TO QUESTIONS 9-10: 1
HOW MANY STANDARD DRINKS CONTAINING ALCOHOL DO YOU HAVE ON A TYPICAL DAY: PATIENT DOES NOT DRINK
AUDIT-C TOTAL SCORE: 0
HOW OFTEN DO YOU HAVE 6 OR MORE DRINKS ON ONE OCCASION: NEVER
AUDIT-C TOTAL SCORE: 0
HOW OFTEN DO YOU HAVE A DRINK CONTAINING ALCOHOL: NEVER

## 2025-04-08 ASSESSMENT — PAIN DESCRIPTION - DESCRIPTORS
DESCRIPTORS: BURNING
DESCRIPTORS: TIGHTNESS

## 2025-04-08 ASSESSMENT — PAIN - FUNCTIONAL ASSESSMENT
PAIN_FUNCTIONAL_ASSESSMENT: 0-10

## 2025-04-08 ASSESSMENT — PAIN SCALES - GENERAL
PAINLEVEL_OUTOF10: 5 - MODERATE PAIN
PAINLEVEL_OUTOF10: 5 - MODERATE PAIN
PAINLEVEL_OUTOF10: 3
PAINLEVEL_OUTOF10: 3

## 2025-04-08 ASSESSMENT — PATIENT HEALTH QUESTIONNAIRE - PHQ9
SUM OF ALL RESPONSES TO PHQ9 QUESTIONS 1 & 2: 2
2. FEELING DOWN, DEPRESSED OR HOPELESS: SEVERAL DAYS
1. LITTLE INTEREST OR PLEASURE IN DOING THINGS: SEVERAL DAYS

## 2025-04-08 ASSESSMENT — ACTIVITIES OF DAILY LIVING (ADL)
DRESSING YOURSELF: INDEPENDENT
LACK_OF_TRANSPORTATION: NO
BATHING: INDEPENDENT
FEEDING YOURSELF: INDEPENDENT
PATIENT'S MEMORY ADEQUATE TO SAFELY COMPLETE DAILY ACTIVITIES?: YES
TOILETING: INDEPENDENT
JUDGMENT_ADEQUATE_SAFELY_COMPLETE_DAILY_ACTIVITIES: YES
ADEQUATE_TO_COMPLETE_ADL: YES
LACK_OF_TRANSPORTATION: NO
HEARING - RIGHT EAR: FUNCTIONAL
HEARING - LEFT EAR: FUNCTIONAL
GROOMING: INDEPENDENT
WALKS IN HOME: INDEPENDENT
LACK_OF_TRANSPORTATION: NO

## 2025-04-08 ASSESSMENT — PAIN DESCRIPTION - ORIENTATION: ORIENTATION: ANTERIOR;MID;UPPER

## 2025-04-08 ASSESSMENT — PAIN DESCRIPTION - PROGRESSION: CLINICAL_PROGRESSION: GRADUALLY IMPROVING

## 2025-04-08 NOTE — PROGRESS NOTES
04/08/25 1530   Discharge Planning   Living Arrangements Spouse/significant other;Children   Support Systems Spouse/significant other   Assistance Needed none   Type of Residence Private residence   Home or Post Acute Services None   Expected Discharge Disposition Home   Does the patient need discharge transport arranged? No   Financial Resource Strain   How hard is it for you to pay for the very basics like food, housing, medical care, and heating? Not very   Housing Stability   In the last 12 months, was there a time when you were not able to pay the mortgage or rent on time? N   In the past 12 months, how many times have you moved where you were living? 0   At any time in the past 12 months, were you homeless or living in a shelter (including now)? N   Transportation Needs   In the past 12 months, has lack of transportation kept you from medical appointments or from getting medications? no   In the past 12 months, has lack of transportation kept you from meetings, work, or from getting things needed for daily living? No   Stroke Family Assessment   Stroke Family Assessment Needed No   Intensity of Service   Intensity of Service 0-30 min     Met with the patient in the room, she states that she lives at home with her boyfriend and 2 year old child. Her boyfriend is currently caring for the child. She is independent in her daily activities, has no current homegoing concerns. She sees her primary care MD, Dr. Daniela Self. She manages her own medications.

## 2025-04-08 NOTE — H&P
History Of Present Illness  Yanelis Carrillo is a 24 y.o. female with past medical history significant for 3rd degree heart block s/p pacemaker, anxiety, depression, anemia, who is presenting to [facility] from home due to body aches and abdominal pain.  The patient reports that she has had diffuse bodyaches with upper abdominal pain for the past 1 week.  She is still able to eat, but notes that symptoms are slightly worse when eating.  She visited urgent care twice due to the symptoms and was prescribed a Z-Antwon, but had an episode of emesis after taking her Z-Antwon yesterday.  She endorses some fevers, chills, and chest discomfort.  Denies any dysuria, hematuria, diarrhea, constipation.  Last bowel movement was yesterday.    PMHx: As Above  PSurgHx: Pacemaker placement,   Allergies: Cefdinir, metronidazole, penicillin  SHx: Denies any tobacco, alcohol, or recreational drug use.  Lives at home with her boyfriend and 2-year-old son.  Currently in nursing school.    ED Course  Vitals: /58, , RR 18, SpO2 99% on room air, T37.6  Labs: CBC demonstrating anemia with hemoglobin 8.3, and thrombocytopenia with platelets 101, no leukocytosis.  CMP demonstrating hyponatremia with sodium 134, hypokalemia with potassium 3.3, transaminitis with AST//228 and alk phos 252, hyperbilirubinemia with T. bili 1.5.  PT/INR 15.0/1.4.  Magnesium 1.90.  Lipase 6.  UA with 1+ urobilinogen, 1+ bilirubin, 1+ protein, but no leukocyte esterase or nitrites.  Beta-hCG urine negative.  Imaging: CT angio for PE negative for PE but did show trace bilateral pleural effusions.  CT A/P demonstrated pericholecystic fluid and possible gallbladder wall thickening.  RUQ US showing markedly thickened gallbladder that is contracted without evidence of cholelithiasis.  EKG showing atrial sensed V paced rhythm with rate 88, TX interval 198, QTc 479.  No obvious evidence of ischemic changes.  Interventions: In the ED, patient was given Tylenol,  ertapenem, potassium chloride 20  mEq, Zofran, and 1 L NS.  Per ED signout, case was discussed with general surgery who recommended admission to general medicine for surgical evaluation and HIDA scan.     Past Medical History  She has a past medical history of Acute superficial venous thrombosis of lower extremity (2023), Anemia (2023), Anxiety and depression (2023), Gestational hypertension (HHS-HCC) (2023), Pain in unspecified ankle and joints of unspecified foot, Personal history of other (healed) physical injury and trauma, Personal history of other (healed) physical injury and trauma, Personal history of other diseases of the female genital tract, Personal history of other diseases of the respiratory system, Personal history of other specified conditions, and Second degree AV block (2023).    Surgical History  She has a past surgical history that includes  section, low transverse (2022) and Cardiac electrophysiology procedure (N/A, 2023).     Social History  She reports that she has never smoked. She has never been exposed to tobacco smoke. She has never used smokeless tobacco. She reports that she does not drink alcohol and does not use drugs.    Family History  Family History   Problem Relation Name Age of Onset    Other (sinus tachycardia) Mother      Skin cancer Paternal Grandfather          Allergies  Cefdinir, Metronidazole, Peanut, Amoxicillin, and Cephalexin    12 point review of systems was completed and is negative unless otherwise noted as above.     Physical Exam  Constitutional:       General: She is not in acute distress.     Appearance: Normal appearance. She is not ill-appearing.   HENT:      Head: Normocephalic and atraumatic.      Mouth/Throat:      Mouth: Mucous membranes are moist.      Pharynx: No posterior oropharyngeal erythema.   Eyes:      General: No scleral icterus.     Extraocular Movements: Extraocular movements intact.    Cardiovascular:      Rate and Rhythm: Normal rate and regular rhythm.      Pulses: Normal pulses.      Heart sounds: Murmur (Systolic) heard.      No friction rub.   Pulmonary:      Effort: Pulmonary effort is normal. No respiratory distress.      Breath sounds: No wheezing, rhonchi or rales.   Abdominal:      General: Abdomen is flat. Bowel sounds are normal. There is no distension.      Palpations: Abdomen is soft.      Tenderness: There is abdominal tenderness.      Comments: Positive Hubbard sign   Musculoskeletal:         General: No swelling or tenderness.   Skin:     General: Skin is warm and dry.   Neurological:      General: No focal deficit present.      Mental Status: She is alert and oriented to person, place, and time.      Motor: No weakness.   Psychiatric:         Mood and Affect: Mood normal.     Last Recorded Vitals  /59   Pulse 84   Temp 37.5 °C (99.5 °F) (Temporal)   Resp 16   Wt 81.6 kg (180 lb)   SpO2 (!) 93%     Relevant Results  Scheduled medications    Continuous medications    PRN medications    Results for orders placed or performed during the hospital encounter of 04/07/25 (from the past 24 hours)   Sars-CoV-2 and Influenza A/B PCR   Result Value Ref Range    Flu A Result Not Detected Not Detected    Flu B Result Not Detected Not Detected    Coronavirus 2019, PCR Not Detected Not Detected   hCG, Urine, Qualitative   Result Value Ref Range    HCG, Urine NEGATIVE NEGATIVE   Urinalysis with Reflex Culture and Microscopic   Result Value Ref Range    Color, Urine Dark-Yellow Light-Yellow, Yellow, Dark-Yellow    Appearance, Urine Clear Clear    Specific Gravity, Urine 1.027 1.005 - 1.035    pH, Urine 6.0 5.0, 5.5, 6.0, 6.5, 7.0, 7.5, 8.0    Protein, Urine 30 (1+) (A) NEGATIVE, 10 (TRACE), 20 (TRACE) mg/dL    Glucose, Urine Normal Normal mg/dL    Blood, Urine NEGATIVE NEGATIVE mg/dL    Ketones, Urine TRACE (A) NEGATIVE mg/dL    Bilirubin, Urine 1 (1+) (A) NEGATIVE mg/dL     Urobilinogen, Urine 3 (1+) (A) Normal mg/dL    Nitrite, Urine NEGATIVE NEGATIVE    Leukocyte Esterase, Urine NEGATIVE NEGATIVE   Urinalysis Microscopic   Result Value Ref Range    WBC, Urine 1-5 1-5, NONE /HPF    RBC, Urine 1-2 NONE, 1-2, 3-5 /HPF    Squamous Epithelial Cells, Urine 1-9 (SPARSE) Reference range not established. /HPF    Mucus, Urine FEW Reference range not established. /LPF   CBC and Auto Differential   Result Value Ref Range    WBC 6.7 4.4 - 11.3 x10*3/uL    nRBC 0.3 (H) 0.0 - 0.0 /100 WBCs    RBC 4.34 4.00 - 5.20 x10*6/uL    Hemoglobin 8.3 (L) 12.0 - 16.0 g/dL    Hematocrit 28.9 (L) 36.0 - 46.0 %    MCV 67 (L) 80 - 100 fL    MCH 19.1 (L) 26.0 - 34.0 pg    MCHC 28.7 (L) 32.0 - 36.0 g/dL    RDW 19.9 (H) 11.5 - 14.5 %    Platelets 101 (L) 150 - 450 x10*3/uL    Immature Granulocytes %, Automated 0.1 0.0 - 0.9 %    Immature Granulocytes Absolute, Automated 0.01 0.00 - 0.70 x10*3/uL   Magnesium   Result Value Ref Range    Magnesium 1.90 1.60 - 2.40 mg/dL   Comprehensive metabolic panel   Result Value Ref Range    Glucose 104 (H) 74 - 99 mg/dL    Sodium 134 (L) 136 - 145 mmol/L    Potassium 3.3 (L) 3.5 - 5.3 mmol/L    Chloride 101 98 - 107 mmol/L    Bicarbonate 23 21 - 32 mmol/L    Anion Gap 13 10 - 20 mmol/L    Urea Nitrogen 9 6 - 23 mg/dL    Creatinine 0.83 0.50 - 1.05 mg/dL    eGFR >90 >60 mL/min/1.73m*2    Calcium 8.6 8.6 - 10.3 mg/dL    Albumin 3.1 (L) 3.4 - 5.0 g/dL    Alkaline Phosphatase 252 (H) 33 - 110 U/L    Total Protein 6.5 6.4 - 8.2 g/dL     (H) 9 - 39 U/L    Bilirubin, Total 1.5 (H) 0.0 - 1.2 mg/dL     (H) 7 - 45 U/L   Lipase   Result Value Ref Range    Lipase 6 (L) 9 - 82 U/L   Manual Differential   Result Value Ref Range    Neutrophils %, Manual 23.0 40.0 - 80.0 %    Bands %, Manual 1.0 0.0 - 5.0 %    Lymphocytes %, Manual 51.0 13.0 - 44.0 %    Monocytes %, Manual 2.0 2.0 - 10.0 %    Eosinophils %, Manual 0.0 0.0 - 6.0 %    Basophils %, Manual 0.0 0.0 - 2.0 %    Atypical  Lymphocytes %, Manual 23.0 0.0 - 2.0 %    Seg Neutrophils Absolute, Manual 1.54 1.20 - 7.00 x10*3/uL    Bands Absolute, Manual 0.07 0.00 - 0.70 x10*3/uL    Lymphocytes Absolute, Manual 3.42 1.20 - 4.80 x10*3/uL    Monocytes Absolute, Manual 0.13 0.10 - 1.00 x10*3/uL    Eosinophils Absolute, Manual 0.00 0.00 - 0.70 x10*3/uL    Basophils Absolute, Manual 0.00 0.00 - 0.10 x10*3/uL    Atypical Lymphs Absolute, Manual 1.54 (H) 0.00 - 0.50 x10*3/uL    Total Cells Counted 100     Neutrophils Absolute, Manual 1.61 1.20 - 7.70 x10*3/uL    RBC Morphology See Below     Ovalocytes Few     Giant Platelets Few    Acetaminophen level   Result Value Ref Range    Acetaminophen <10.0 10.0 - 30.0 ug/mL   Coagulation Screen   Result Value Ref Range    Protime 15.0 (H) 9.8 - 12.4 seconds    INR 1.4 (H) 0.9 - 1.1    aPTT 32 26 - 36 seconds   Type And Screen   Result Value Ref Range    ABO TYPE A     Rh TYPE POS     ANTIBODY SCREEN NEG      US right upper quadrant    Result Date: 4/8/2025  STUDY: Right Upper Quadrant Ultrasound; 04/08/2025 2:22 am INDICATION: Concern for cholecystis.  Abnormal CTAP. COMPARISON: CT A/P 04/07/2025. ACCESSION NUMBER(S): [id number] ORDERING CLINICIAN: JUANCARLOS HUBER TECHNIQUE: Ultrasound of the Right Upper Quadrant.  FINDINGS: LIVER: The liver 16.7 cm   GALLBLADDER: The gallbladder wall is markedly thickened measuring 14 mm in maximum thickness.  The gallbladder is contracted.    BILE DUCTS: The common bile duct measures 0.3 cm.  There is no intrahepatic biliary dilatation.   PANCREAS: The pancreas is unremarkable  RIGHT KIDNEY: The right kidney measures 10.7 cm in length.  Renal cortical echotexture is normal.  There is no hydronephrosis.  There are no stones.  There are no cysts.    The gallbladder wall is markedly thickened measuring 14 mm in maximum thickness. The gallbladder is contracted. The wall thickening may be due to the contracted state of the gallbladder. There is no evidence of  cholelithiasis. The CT scan of the abdomen and pelvis with intravenous and oral contrast would be recommended for further evaluation. Signed by Rohan Delcid MD    CT angio chest for pulmonary embolism    Result Date: 4/8/2025  Interpreted By:  Jose Arciniega, STUDY: CT ANGIO CHEST FOR PULMONARY EMBOLISM; CT ABDOMEN PELVIS W IV CONTRAST;  4/7/2025 10:57 pm   INDICATION: Signs/Symptoms:Shortness of breath; Signs/Symptoms:Epigastric right upper quadrant abdominal pain.   COMPARISON: None.   ACCESSION NUMBER(S): [id number]; [id number]   ORDERING CLINICIAN: JUANCARLOS HUBER   TECHNIQUE: CT of the chest, abdomen, and pelvis was performed. Contiguous axial images were obtained through the chest, abdomen and pelvis. Coronal and sagittal reconstructions were performed. 75 ML of Omnipaque 350 was administered intravenously without immediate complication. Chest images were acquired in the pulmonary angiographic phase. MIP/3D reconstructions were performed on a separate workstation and provided for interpretation.   FINDINGS: CHEST:   LOWER NECK AND CHEST WALL:  Left pacemaker.   MEDIASTINUM/GASPER:No lymphadenopathy. Esophagus is unremarkable.   CARDIOVASCULAR:  Cardiac chamber size within normal limits. Pacemaker leads terminating within the right atrium and right ventricle. No pericardial effusion.  Aortic caliber normal. Normal caliber of main pulmonary artery.No pulmonary emboli.   LUNGS, AIRWAYS, AND PLEURA:  There is mild dependent atelectasis. Trace bilateral pleural effusions. The trachea and central airways are patent.   MUSCULOSKELETAL: No acute osseous abnormality or suspicious osseous lesions.       ABDOMEN:   LIVER: Within normal limits. Slight periportal edema.   BILE DUCTS: Normal caliber.   GALLBLADDER: There is pericholecystic fluid and possible gallbladder wall thickening. No calcified gallstones.   PANCREAS: Within normal limits.   SPLEEN: Splenomegaly measuring 15 cm in length.   ADRENALS: Within  normal limits.   KIDNEYS, URETERS, and BLADDER: No hydronephrosis or renal calculi. Ureters are non-dilated. Urinary bladder is decompressed, limiting evaluation.   REPRODUCTIVE: No pelvic masses.   VESSELS: Aorta and IVC appear normal.   RETROPERITONEUM and LYMPH NODES: No lymphadenopathy.   BOWEL: The stomach is unremarkable. Small bowel is non-dilated. Normal appendix. Large bowel is normal.   PERITONEUM: Small volume ascites. No free air.   BODY WALL: Small fat containing periumbilical hernia.   MUSCULOSKELETAL: No acute osseous abnormality or suspicious osseous lesions.         1. No pulmonary embolism. Trace bilateral pleural effusions. 2. Pericholecystic fluid and possible gallbladder wall thickening. No calcified gallstones. Consider right upper quadrant ultrasound. 3. Small volume ascites. 4. Splenomegaly.     Signed by: Jose Arciniega 4/8/2025 12:13 AM Dictation workstation:   [id number]    CT abdomen pelvis w IV contrast    Result Date: 4/8/2025  Interpreted By:  Jose Arciniega, STUDY: CT ANGIO CHEST FOR PULMONARY EMBOLISM; CT ABDOMEN PELVIS W IV CONTRAST;  4/7/2025 10:57 pm   INDICATION: Signs/Symptoms:Shortness of breath; Signs/Symptoms:Epigastric right upper quadrant abdominal pain.   COMPARISON: None.   ACCESSION NUMBER(S): [id number]; [id number]   ORDERING CLINICIAN: JUANCARLOS HUBER   TECHNIQUE: CT of the chest, abdomen, and pelvis was performed. Contiguous axial images were obtained through the chest, abdomen and pelvis. Coronal and sagittal reconstructions were performed. 75 ML of Omnipaque 350 was administered intravenously without immediate complication. Chest images were acquired in the pulmonary angiographic phase. MIP/3D reconstructions were performed on a separate workstation and provided for interpretation.   FINDINGS: CHEST:   LOWER NECK AND CHEST WALL:  Left pacemaker.   MEDIASTINUM/GASPER:No lymphadenopathy. Esophagus is unremarkable.   CARDIOVASCULAR:  Cardiac chamber  size within normal limits. Pacemaker leads terminating within the right atrium and right ventricle. No pericardial effusion.  Aortic caliber normal. Normal caliber of main pulmonary artery.No pulmonary emboli.   LUNGS, AIRWAYS, AND PLEURA:  There is mild dependent atelectasis. Trace bilateral pleural effusions. The trachea and central airways are patent.   MUSCULOSKELETAL: No acute osseous abnormality or suspicious osseous lesions.       ABDOMEN:   LIVER: Within normal limits. Slight periportal edema.   BILE DUCTS: Normal caliber.   GALLBLADDER: There is pericholecystic fluid and possible gallbladder wall thickening. No calcified gallstones.   PANCREAS: Within normal limits.   SPLEEN: Splenomegaly measuring 15 cm in length.   ADRENALS: Within normal limits.   KIDNEYS, URETERS, and BLADDER: No hydronephrosis or renal calculi. Ureters are non-dilated. Urinary bladder is decompressed, limiting evaluation.   REPRODUCTIVE: No pelvic masses.   VESSELS: Aorta and IVC appear normal.   RETROPERITONEUM and LYMPH NODES: No lymphadenopathy.   BOWEL: The stomach is unremarkable. Small bowel is non-dilated. Normal appendix. Large bowel is normal.   PERITONEUM: Small volume ascites. No free air.   BODY WALL: Small fat containing periumbilical hernia.   MUSCULOSKELETAL: No acute osseous abnormality or suspicious osseous lesions.         1. No pulmonary embolism. Trace bilateral pleural effusions. 2. Pericholecystic fluid and possible gallbladder wall thickening. No calcified gallstones. Consider right upper quadrant ultrasound. 3. Small volume ascites. 4. Splenomegaly.     Signed by: Jose Arciniega 4/8/2025 12:13 AM Dictation workstation:   [id number]        Assessment/Plan   Assessment & Plan  Acalculous cholecystitis    Patient is a 24-year-old female past medical history significant for third-degree heart block s/p pacemaker, anxiety, depression, anemia, who initially presented due to generalized body aches and abdominal  pain.  On initial evaluation the patient was slightly tachycardic but otherwise HDS on RA.  Labs demonstrated anemia, thrombocytopenia, transaminitis, hyperbilirubinemia, hypokalemia, mild hyponatremia.  Imaging demonstrated acalculous cholecystitis.  The ED discussed the case with general surgery, the patient was then admitted to general medicine for further management evaluation.    # Acalculous cholecystitis  # Cholestatic pattern transaminitis  # Hyperbilirubinemia  # Abdominal pain  - On admission AST//228, alk phos 252, T. bili 1.5  - RUQ demonstrating gallbladder wall thickening without evidence of cholelithiasis  - CT A/P demonstrating pericholecystic fluid and possible gallbladder wall thickening without calcified gallstones  - Patient received ertapenem in the ED due to significant allergies, will continue ertapenem at this time  - Will avoid antipyretics such as Tylenol to monitor for fevers, patient otherwise declined further pain medications at this time  - HIDA scan ordered  - Will maintain as NPO and order maintenance LR  - Trend hepatic function  - General Surgery consulted, appreciate recs    # Microcytic anemia  # Thrombocytopenia  Baseline hemoglobin appears to fluctuate, given low MCV anemia may represent iron deficiency.  Lack of hemodynamic instability makes acute bleed less likely.  Thrombocytopenia unclear and underlying etiology, this may be due to hepatic dysfunction in the setting of transaminitis.  - Baseline hemoglobin ~ 9.0-11.0  - Will check iron panel to evaluate for iron deficiency anemia  - Smear sent to evaluate thrombocytopenia  - No obvious signs or symptoms of acute acute bleed, will monitor close    # Third-degree heart block s/p pacemaker  # Hypokalemia  EKG on admission without concerning abnormalities, will continue to monitor on telemetry and optimize wall hospitalized.  - Optimize electrolytes with goal K > 4.0 and Mg > 2.0  - Continuous telemetry    Chronic  conditions  # Anxiety  # Depression  - Continue home medications and management as appropriate, pending formal med rec    IVF: Maintenance LR  Diet: NPO  ABx: Ertapenem  Consults: General surgery  DVT: Low risk, no indication for chemical prophylaxis    CODE STATUS: Full    Dispo: Will need surgical evaluation and HIDA scan, anticipate 2-3 midnight stays.    Case to be staffed with attending physician,  Devendra Chávez, DO  Internal Medicine PGY-2

## 2025-04-08 NOTE — PROGRESS NOTES
Nutrition Initial Assessment:   Nutrition Assessment    Reason for Assessment: Admission nursing screening (MST score 4 for wt loss and decreased appetite.)    Patient is a 24 y.o. female presenting from home w/son and boyfriend for gammaherpesviral mononucleosis w/out complication. Acute surgery on consult for acalculous cholecystitis. Pt aunt and grandmother present during RD visit.    Past Medical History: third degree heart block w/pacemkaer   has a past medical history of Acute superficial venous thrombosis of lower extremity (2023), Anemia (2023), Anemia in pregnancy (2025), Anemia of pregnancy (2025), Ankle pain (2025), Anxiety and depression (2023), Bacterial vaginosis (2025), Edema (2025), Gestational hypertension ([facility]) (2023), Increased urinary frequency (2025), Infection due to fungus (2025), Pain in unspecified ankle and joints of unspecified foot, Pain of left lower extremity (2025), Personal history of other (healed) physical injury and trauma, Personal history of other (healed) physical injury and trauma, Personal history of other diseases of the female genital tract, Personal history of other diseases of the respiratory system, Personal history of other specified conditions, Pruritus of vagina (2025), Second degree AV block (2023), Shortness of breath (2025), Spotting during pregnancy (HHS-HCC) (2025), Spotting in early pregnancy (HHS-HCC) (2025), Vaginal discharge (2025), Vaginal itching (2025), Vaginal odor (2025), Vaginal odor (2025), and Yeast infection (2025).  Surgical History   has a past surgical history that includes  section, low transverse (2022) and Cardiac electrophysiology procedure (N/A, 2023).    Nutrition History:  Energy Intake: Poor < 50 %  Pain affecting nutrition status: N/A  Food and Nutrient History: Pt w/poor PO intakes x 1  "week PTA, eating about half usual intakes. Pt w/regular diet, eats 3 meals/day. Pt desires wt loss and healthier eating, but does not currently eat healthy she reports.  Vitamin/Herbal Supplement Use: Iron - PRN, Vitamin C and Vitamin D.   No oral nutrition supplements.  Food Allergy:  (peanuts and tree nuts)  Food Intolerance:  (no food intolerances per pt - allergies)     Anthropometrics:  Height: 167.6 cm (5' 6\")   Weight: 81.6 kg (180 lb)   BMI (Calculated): 29.07          Weight History:   Wt Readings from Last 22 Encounters:   04/07/25 81.6 kg (180 lb)   03/17/25 81.6 kg (180 lb)   10/28/24 81.6 kg (180 lb)   05/29/24 80.9 kg (178 lb 6 oz)   04/19/24 82.1 kg (181 lb)   02/23/24 82.7 kg (182 lb 4 oz)   01/11/24 80.4 kg (177 lb 3.2 oz)   12/26/23 80.7 kg (178 lb)   11/14/23 77.1 kg (170 lb)   11/14/23 80 kg (176 lb 5.9 oz)   04/10/23 75.3 kg (166 lb)   04/06/23 75.3 kg (166 lb)   01/11/23 73.7 kg (162 lb 8 oz)   01/09/23 73.5 kg (162 lb)   11/01/22 72.1 kg (159 lb)   09/22/22 73.9 kg (163 lb)   09/16/22 74 kg (163 lb 4 oz)   09/02/22 75.5 kg (166 lb 6 oz)   08/12/22 77.6 kg (171 lb)   07/28/22 80.3 kg (177 lb)   07/15/22 91.4 kg (201 lb 9.6 oz)   07/07/22 88.2 kg (194 lb 8 oz)     Weight Change %:  Weight History / % Weight Change: per archives 4/16/24 182#, 11/23 170#. RN attempted re-weight and was 150#. Pt reports UBW  176-180# and was within this wt range 2 weeks ago when last weighed herself at home. Pt does not endorse clothing fit changes. 16.7% wt loss x 2 weeks per pt report of wts.  Significant Weight Loss: Yes  Interpretation of Weight Loss: >5% in 1 month    Nutrition Focused Physical Exam Findings:  Subcutaneous Fat Loss:   Orbital Fat Pads: Well nourished (slightly bulging fat pads)  Buccal Fat Pads: Well nourished (full, rounded cheeks)  Triceps: Well nourished (ample fat tissue)  Muscle Wasting:  Temporalis: Well nourished (well-defined muscle)  Pectoralis (Clavicular Region): Well nourished " (clavicle not visible)  Interosseous: Well nourished (muscle bulges)  Quadriceps: Well nourished (well developed, well rounded)  Gastrocnemius: Well nourished (well developed bulbous muscle)  Edema:  Edema: none  Physical Findings:  Skin: Negative  Digestive System Findings: Abdominal pain, Nausea, Vomiting, Anorexia    Nutrition Significant Labs:  CBC Trend:   Results from last 7 days   Lab Units 04/08/25  0703 04/07/25 2128   WBC AUTO x10*3/uL 5.4  5.4 6.7   RBC AUTO x10*6/uL 4.11  4.11 4.34   HEMOGLOBIN g/dL 7.8*  7.8* 8.3*   HEMATOCRIT % 27.4*  27.4* 28.9*   MCV fL 67*  67* 67*   PLATELETS AUTO x10*3/uL 94*  94* 101*    , BMP Trend:   Results from last 7 days   Lab Units 04/08/25 0703 04/07/25 2128   GLUCOSE mg/dL 94 104*   CALCIUM mg/dL 7.7* 8.6   SODIUM mmol/L 137 134*   POTASSIUM mmol/L 3.5 3.3*   CO2 mmol/L 25 23   CHLORIDE mmol/L 106 101   BUN mg/dL 7 9   CREATININE mg/dL 0.80 0.83    , A1C:  Lab Results   Component Value Date    HGBA1C 5.5 11/13/2023   , BG POCT trend:    , Liver Function Trend:   Results from last 7 days   Lab Units 04/08/25  0703 04/07/25 2128   ALK PHOS U/L 234* 252*   AST U/L 205* 268*   ALT U/L 201* 228*   BILIRUBIN TOTAL mg/dL 0.9 1.5*    , Renal Lab Trend:   Results from last 7 days   Lab Units 04/08/25  0703 04/07/25 2128   POTASSIUM mmol/L 3.5 3.3*   PHOSPHORUS mg/dL 3.7  --    SODIUM mmol/L 137 134*   MAGNESIUM mg/dL  --  1.90   EGFR mL/min/1.73m*2 >90 >90   BUN mg/dL 7 9   CREATININE mg/dL 0.80 0.83    , TPN/PPN Labs:   Results from last 7 days   Lab Units 04/08/25 0703 04/07/25 2128   GLUCOSE mg/dL 94 104*   POTASSIUM mmol/L 3.5 3.3*   PHOSPHORUS mg/dL 3.7  --    MAGNESIUM mg/dL  --  1.90   SODIUM mmol/L 137 134*   CHLORIDE mmol/L 106 101   ALT U/L 201* 228*   AST U/L 205* 268*   ALK PHOS U/L 234* 252*   BILIRUBIN TOTAL mg/dL 0.9 1.5*    , Lipid Panel:   Lab Results   Component Value Date    CHOL 170 11/14/2023    HDL 47.6 11/14/2023    CHHDL 3.6 11/14/2023    LDLF  "131 (H) 07/24/2022    VLDL 14 11/14/2023    TRIG 70 11/14/2023    , Vit D: No results found for: \"VITD25\" , Vit B12:   Lab Results   Component Value Date    KZJKLXLA15 355 11/14/2023    , Iron Panel:   Lab Results   Component Value Date    IRON 17 (L) 04/07/2025    TIBC 404 04/07/2025    FERRITIN 74 04/07/2025      Nutrition Specific Medications:  Scheduled medications  escitalopram, 20 mg, oral, Nightly  iron sucrose, 200 mg, intravenous, Daily  polyethylene glycol, 17 g, oral, Daily    Continuous medications     PRN medications    0-10 (Numeric) Pain Score: 5 - Moderate pain    I/O:    ;    Dietary Orders (From admission, onward)       Start     Ordered    04/08/25 1549  Oral nutritional supplements  Until discontinued        Comments: PRN per pt request   Question:  Select supplement:  Answer:  Ensure Clear    04/08/25 1552    04/08/25 1549  Oral nutritional supplements  Until discontinued        Question Answer Comment   Deliver with Breakfast    Deliver with Dinner    Select supplement: Product from home - please specify    Additional Details Ensure Max - chocolate        04/08/25 1552    04/08/25 1546  Adult diet Regular  Diet effective now        Question:  Diet type  Answer:  Regular    04/08/25 1545    04/08/25 1026  May Participate in Room Service  ( ROOM SERVICE MAY PARTICIPATE)  Once        Question:  .  Answer:  Yes    04/08/25 1025                Estimated Needs:   Total Energy Estimated Needs in 24 hours (kCal):  (6069-2171 (20-25kcal/kg))     Total Protein Estimated Needs in 24 Hours (g):  (72-90 (1.2-1.5g/kg IBW))      Method for Estimating 24 Hour Fluid Needs: 1mL/kcal  Patient on Order Fluid Restriction: No      Nutrition Diagnosis        Nutrition Diagnosis  Patient has Nutrition Diagnosis: Yes  Diagnosis Status (1): New  Nutrition Diagnosis 1: Inadequate oral intake  Related to (1): altered GI function  As Evidenced by (1): abd pain, nausea and vomiting PTA with reported meal intakes of 50% of " usual intake x 1 week.  Additional Assessment Information (1): Possible malnutrition - awaiting wt loss confirmation. Recommended obtaining standing scale weight - dicussed w/pt and RN.     Nutrition Interventions/Recommendations   Nutrition prescription for oral nutrition    Nutrition Recommendations:  Individualized Nutrition Prescription Provided for : Diet: continue w/oral diet of Regular as ordered + Ensure Clear PRN and Ensure Max 2x/day.    Nutrition Interventions/Goals:   Meals and Snacks: General healthful diet  Goal: will consume >50% of meals.  Medical Food Supplement: Commercial beverage medical food supplement therapy  Goal: will provide Ensure Clear PRN per pt request (240kcal, 8g pro per 8oz) and Ensure Max at breakfast and dinner (150kcal, 30g pro per 11oz).  Coordination of Care with Providers: Nursing (RN Zahraa HANEY)    Education Documentation  No documentation found.     AYR ordering and ONS reviewed. Will follow plan of care for education needs. Encouraged PO intakes as tolerated and weekly wts at home.     Nutrition Monitoring and Evaluation   Food/Nutrient Related History Monitoring  Monitoring and Evaluation Plan: Estimated Energy Intake  Estimated Energy Intake: Energy intake greater or equal to 75% of estimated energy needs    Anthropometric Measurements  Monitoring and Evaluation Plan: Body weight  Body Weight: Body weight - Maintain stable weight    Biochemical Data, Medical Tests and Procedures  Monitoring and Evaluation Plan: Electrolyte/renal panel, Glucose/endocrine profile  Electrolyte and Renal Panel: Electrolytes within normal limits  Glucose/Endocrine Profile: Glucose within normal limits ( mg/dL)    Physical Exam Findings  Monitoring and Evaluation Plan: Digestive System  Digestive System Finding: Nausea, Vomiting, Abdominal pain, Anorexia  Criteria: will resolve    Goal Status: New goal(s) identified    Time Spent (min): 60 minutes     Follow up 3-5 days  Last RD note  4/8/25
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
## 2025-04-08 NOTE — CONSULTS
Reason For Consult  Abdominal Pain    History Of Present Illness  Yanelis Carrillo is a 24 y.o. female presenting with 6 days of epigastric and RUQ pain She has had anorexia, fevers to 101, and has been to Urgent Care twice, the last time prescribed a Z-Pack but then developed N/V ER work up included CT A/P and RUQ ultrasound, which bth shw thickening of gall bladder wall, but no stones She is scheduled to have a HIDA scan today. She has a hx of 3 degree heart block for which she had a permanent pacemaker placed. She also has hx of depression and anxiety.      Past Medical History  She has a past medical history of Acute superficial venous thrombosis of lower extremity (2023), Anemia (2023), Anemia in pregnancy (2025), Anemia of pregnancy (2025), Ankle pain (2025), Anxiety and depression (2023), Bacterial vaginosis (2025), Edema (2025), Gestational hypertension ([facility]) (2023), Increased urinary frequency (2025), Infection due to fungus (2025), Pain in unspecified ankle and joints of unspecified foot, Pain of left lower extremity (2025), Personal history of other (healed) physical injury and trauma, Personal history of other (healed) physical injury and trauma, Personal history of other diseases of the female genital tract, Personal history of other diseases of the respiratory system, Personal history of other specified conditions, Pruritus of vagina (2025), Second degree AV block (2023), Shortness of breath (2025), Spotting during pregnancy ([facility]) (2025), Spotting in early pregnancy ([facility]) (2025), Vaginal discharge (2025), Vaginal itching (2025), Vaginal odor (2025), Vaginal odor (2025), and Yeast infection (2025).    Surgical History  She has a past surgical history that includes  section, low transverse (2022) and Cardiac electrophysiology procedure (N/A, 2023).    "  Social History  She reports that she has never smoked. She has never been exposed to tobacco smoke. She has never used smokeless tobacco. She reports that she does not drink alcohol and does not use drugs.    Family History  Family History   Problem Relation Name Age of Onset    Other (sinus tachycardia) Mother      Skin cancer Paternal Grandfather          Allergies  Cefdinir, Metronidazole, Peanut, Amoxicillin, Cephalexin, and Doxycycline    Review of Systems  As per HPI     Physical Exam  Alert, NAD, 24 y.o. female  Neck is supple, o JVD noted  Sclera are anicteric  Lungs are clear Bilat  RRR no murmurs or rubs  Abdomen is soft with RUQ tenderness  There is no rebound or guarding  Extremities are without edema       Last Recorded Vitals  Blood pressure 114/63, pulse 73, temperature 37.5 °C (99.5 °F), temperature source Temporal, resp. rate 18, height 1.676 m (5' 6\"), weight 81.6 kg (180 lb), last menstrual period 03/07/2025, SpO2 95%.    Relevant Results       Assessment/Plan     Abdominal Pain r/o acute cholecystitis  Patient was seen with Dr. Madsen on rounds.    I spent 30 minutes in the professional and overall care of this patient.      Juanjose Can PA-C    ATTENDING ADDENDUM:  HIDA scan negative for acute cholecystitis.  OK for diet from surgical standpoint.  Surgery will sign off, please call with any questions/concerns.    Juanjose Madsen MD   4/9/2025  6:48 AM       "

## 2025-04-08 NOTE — ED TRIAGE NOTES
Pt comes in from home for flu like symptoms for 5 days. Pt states that she has been seen and evaluated at two urgent cares for this. Pt states that she is currently on day 2 of a Z-Pack. Pt states that atbx made her vomit last night.  Pt states Fever/chills, body aches and decreased appetite.  Pt states intermittent abd pain also.

## 2025-04-09 ENCOUNTER — PHARMACY VISIT (OUTPATIENT)
Dept: PHARMACY | Facility: CLINIC | Age: 25
End: 2025-04-09
Payer: MEDICAID

## 2025-04-09 VITALS
DIASTOLIC BLOOD PRESSURE: 60 MMHG | WEIGHT: 180 LBS | BODY MASS INDEX: 28.93 KG/M2 | HEART RATE: 71 BPM | HEIGHT: 66 IN | OXYGEN SATURATION: 96 % | RESPIRATION RATE: 19 BRPM | TEMPERATURE: 97.7 F | SYSTOLIC BLOOD PRESSURE: 113 MMHG

## 2025-04-09 LAB
ALBUMIN SERPL BCP-MCNC: 2.7 G/DL (ref 3.4–5)
ALP SERPL-CCNC: 238 U/L (ref 33–110)
ALT SERPL W P-5'-P-CCNC: 171 U/L (ref 7–45)
ANION GAP SERPL CALC-SCNC: 9 MMOL/L (ref 10–20)
AST SERPL W P-5'-P-CCNC: 127 U/L (ref 9–39)
BILIRUB DIRECT SERPL-MCNC: 0.3 MG/DL (ref 0–0.3)
BILIRUB SERPL-MCNC: 0.8 MG/DL (ref 0–1.2)
BUN SERPL-MCNC: 7 MG/DL (ref 6–23)
CALCIUM SERPL-MCNC: 8 MG/DL (ref 8.6–10.3)
CHLORIDE SERPL-SCNC: 106 MMOL/L (ref 98–107)
CO2 SERPL-SCNC: 27 MMOL/L (ref 21–32)
CREAT SERPL-MCNC: 0.69 MG/DL (ref 0.5–1.05)
EGFRCR SERPLBLD CKD-EPI 2021: >90 ML/MIN/1.73M*2
ERYTHROCYTE [DISTWIDTH] IN BLOOD BY AUTOMATED COUNT: 20.3 % (ref 11.5–14.5)
GLUCOSE SERPL-MCNC: 93 MG/DL (ref 74–99)
HCT VFR BLD AUTO: 28 % (ref 36–46)
HGB BLD-MCNC: 7.9 G/DL (ref 12–16)
MCH RBC QN AUTO: 18.9 PG (ref 26–34)
MCHC RBC AUTO-ENTMCNC: 28.2 G/DL (ref 32–36)
MCV RBC AUTO: 67 FL (ref 80–100)
NRBC BLD-RTO: 0.4 /100 WBCS (ref 0–0)
PHOSPHATE SERPL-MCNC: 3.7 MG/DL (ref 2.5–4.9)
PLATELET # BLD AUTO: 101 X10*3/UL (ref 150–450)
POTASSIUM SERPL-SCNC: 4 MMOL/L (ref 3.5–5.3)
PROT SERPL-MCNC: 5.9 G/DL (ref 6.4–8.2)
RBC # BLD AUTO: 4.19 X10*6/UL (ref 4–5.2)
SODIUM SERPL-SCNC: 138 MMOL/L (ref 136–145)
WBC # BLD AUTO: 5.7 X10*3/UL (ref 4.4–11.3)

## 2025-04-09 PROCEDURE — 85027 COMPLETE CBC AUTOMATED: CPT

## 2025-04-09 PROCEDURE — 36415 COLL VENOUS BLD VENIPUNCTURE: CPT

## 2025-04-09 PROCEDURE — 2500000004 HC RX 250 GENERAL PHARMACY W/ HCPCS (ALT 636 FOR OP/ED)

## 2025-04-09 PROCEDURE — 96376 TX/PRO/DX INJ SAME DRUG ADON: CPT

## 2025-04-09 PROCEDURE — RXMED WILLOW AMBULATORY MEDICATION CHARGE

## 2025-04-09 PROCEDURE — 99238 HOSP IP/OBS DSCHRG MGMT 30/<: CPT

## 2025-04-09 PROCEDURE — 84100 ASSAY OF PHOSPHORUS: CPT

## 2025-04-09 PROCEDURE — 84075 ASSAY ALKALINE PHOSPHATASE: CPT

## 2025-04-09 PROCEDURE — G0378 HOSPITAL OBSERVATION PER HR: HCPCS

## 2025-04-09 PROCEDURE — 96375 TX/PRO/DX INJ NEW DRUG ADDON: CPT

## 2025-04-09 RX ORDER — IBUPROFEN 800 MG/1
800 TABLET ORAL 3 TIMES DAILY PRN
Qty: 15 TABLET | Refills: 0 | Status: SHIPPED | OUTPATIENT
Start: 2025-04-09 | End: 2025-04-14

## 2025-04-09 RX ORDER — KETOROLAC TROMETHAMINE 15 MG/ML
15 INJECTION, SOLUTION INTRAMUSCULAR; INTRAVENOUS ONCE
Status: COMPLETED | OUTPATIENT
Start: 2025-04-09 | End: 2025-04-09

## 2025-04-09 RX ORDER — ACETAMINOPHEN 500 MG
500 TABLET ORAL EVERY 8 HOURS PRN
Qty: 15 TABLET | Refills: 0 | Status: SHIPPED | OUTPATIENT
Start: 2025-04-09 | End: 2025-04-14

## 2025-04-09 RX ORDER — FERROUS SULFATE 325(65) MG
1 TABLET ORAL EVERY OTHER DAY
Qty: 15 TABLET | Refills: 1 | Status: SHIPPED | OUTPATIENT
Start: 2025-04-09 | End: 2025-06-08

## 2025-04-09 RX ADMIN — KETOROLAC TROMETHAMINE 15 MG: 15 INJECTION, SOLUTION INTRAMUSCULAR; INTRAVENOUS at 07:25

## 2025-04-09 RX ADMIN — IRON SUCROSE 200 MG: 20 INJECTION, SOLUTION INTRAVENOUS at 06:23

## 2025-04-09 ASSESSMENT — COGNITIVE AND FUNCTIONAL STATUS - GENERAL
MOBILITY SCORE: 24
DAILY ACTIVITIY SCORE: 24

## 2025-04-09 ASSESSMENT — PAIN DESCRIPTION - DESCRIPTORS: DESCRIPTORS: HEADACHE;PRESSURE

## 2025-04-09 ASSESSMENT — PAIN SCALES - GENERAL: PAINLEVEL_OUTOF10: 5 - MODERATE PAIN

## 2025-04-09 ASSESSMENT — PAIN DESCRIPTION - LOCATION: LOCATION: HEAD

## 2025-04-09 ASSESSMENT — PAIN - FUNCTIONAL ASSESSMENT: PAIN_FUNCTIONAL_ASSESSMENT: 0-10

## 2025-04-09 NOTE — CARE PLAN
The patient's goals for the shift include      The clinical goals for the shift include remain afebrile this shift      Problem: Pain - Adult  Goal: Verbalizes/displays adequate comfort level or baseline comfort level  Outcome: Progressing     Problem: Safety - Adult  Goal: Free from fall injury  Outcome: Progressing     Problem: Discharge Planning  Goal: Discharge to home or other facility with appropriate resources  Outcome: Progressing     Problem: Chronic Conditions and Co-morbidities  Goal: Patient's chronic conditions and co-morbidity symptoms are monitored and maintained or improved  Outcome: Progressing     Problem: Nutrition  Goal: Nutrient intake appropriate for maintaining nutritional needs  Outcome: Progressing

## 2025-04-09 NOTE — NURSING NOTE
Discharge instructions provided, medications delivered to the bedside and explained, patient is to follow up with her PCP for further care.

## 2025-04-09 NOTE — DISCHARGE SUMMARY
Discharge Diagnosis  Infectious mononucleosis  Mild transaminitis  Iron deficiency anemia    Issues Requiring Follow-Up  Patient advised to avoid contact sports for the next 3 to 4 weeks.  Patient advised to take Ferosul 325 mg 1 tablet by mouth every other day  CBC and CMP in 1 week  Anemia    Discharge Meds     Medication List      START taking these medications     ibuprofen 800 mg tablet; Take 1 tablet (800 mg) by mouth 3 times a day   as needed for mild pain (1 - 3) (pain) for up to 5 days.   Pain Reliever (acetaminophen) 500 mg tablet; Generic drug:   acetaminophen; Take 1 tablet (500 mg) by mouth every 8 hours if needed for   mild pain (1 - 3) for up to 5 days.     CHANGE how you take these medications     escitalopram 20 mg tablet; Commonly known as: Lexapro; Take 1 tablet (20   mg) by mouth once daily. Take 1/2 Tablet daily x 7 days, then 1 tablet PO   QD   FeroSuL 325 mg (65 mg iron) tablet; Generic drug: ferrous sulfate; Take   1 tablet (325 mg) by mouth every other day.; What changed: when to take   this, reasons to take this     CONTINUE taking these medications     tranexamic acid 650 mg tablet; Commonly known as: Lysteda; Take 2   tablets (1,300 mg) by mouth 3 times a day. Start taking when period   starts, 2 pills, three times per day for up to 5 days     STOP taking these medications     azithromycin 250 mg tablet; Commonly known as: Zithromax       Test Results Pending At Discharge  Pending Labs       No current pending labs.            Hospital Course  Yanelis Carrillo is a 24 y.o. female with past medical history significant for 3rd degree heart block s/p pacemaker, anxiety, depression, anemia, who is presenting to [facility] from home due to body aches and abdominal pain.  The patient reports that she has had diffuse bodyaches with upper abdominal pain for the past 1 week.  She is still able to eat, but notes that symptoms are slightly worse when eating.  She visited urgent care twice due to the symptoms  and was prescribed a Z-Antwon, but had an episode of emesis after taking her Z-Antwon yesterday.  She endorses some fevers, chills, and chest discomfort.  Denies any dysuria, hematuria, diarrhea, constipation.  Last bowel movement was yesterday.     PMHx: As Above  PSurgHx: Pacemaker placement,   Allergies: Cefdinir, metronidazole, penicillin  SHx: Denies any tobacco, alcohol, or recreational drug use.  Lives at home with her boyfriend and 2-year-old son.  Currently in nursing school.     ED Course  Vitals: /58, , RR 18, SpO2 99% on room air, T37.6  Labs: CBC demonstrating anemia with hemoglobin 8.3, and thrombocytopenia with platelets 101, no leukocytosis.  CMP demonstrating hyponatremia with sodium 134, hypokalemia with potassium 3.3, transaminitis with AST//228 and alk phos 252, hyperbilirubinemia with T. bili 1.5.  PT/INR 15.0/1.4.  Magnesium 1.90.  Lipase 6.  UA with 1+ urobilinogen, 1+ bilirubin, 1+ protein, but no leukocyte esterase or nitrites.  Beta-hCG urine negative.  Imaging: CT angio for PE negative for PE but did show trace bilateral pleural effusions.  CT A/P demonstrated pericholecystic fluid and possible gallbladder wall thickening.  RUQ US showing markedly thickened gallbladder that is contracted without evidence of cholelithiasis.  EKG showing atrial sensed V paced rhythm with rate 88, MD interval 198, QTc 479.  No obvious evidence of ischemic changes.  Interventions: In the ED, patient was given Tylenol, ertapenem, potassium chloride 20  mEq, Zofran, and 1 L NS.  Per ED signout, case was discussed with general surgery who recommended admission to general medicine for surgical evaluation and HIDA scan.    After admission HIDA scan was done and is negative for cholecystitis.  Patient was found to be positive for EBV and she was managed conservatively.  She is advised to continue with adequate oral rehydration and to avoid contact sports for the next 3 to 4 weeks.  CBC and CMP  are ordered to be done in 1 week.  Patient discharged in stable medical condition and advised to return to the ED for any concerning issues or medical problems.    Pertinent Physical Exam At Time of Discharge  Physical Exam  HENT:      Head: Normocephalic and atraumatic.      Nose: Nose normal.   Eyes:      Extraocular Movements: Extraocular movements intact.   Cardiovascular:      Rate and Rhythm: Normal rate.      Heart sounds: Normal heart sounds.   Pulmonary:      Effort: Pulmonary effort is normal.      Breath sounds: Normal breath sounds.   Abdominal:      General: Abdomen is flat. Bowel sounds are normal.   Musculoskeletal:         General: Normal range of motion.      Cervical back: Normal range of motion.   Skin:     General: Skin is warm.      Capillary Refill: Capillary refill takes less than 2 seconds.   Neurological:      General: No focal deficit present.      Mental Status: She is alert and oriented to person, place, and time.   Psychiatric:         Mood and Affect: Mood normal.         Behavior: Behavior normal.         Thought Content: Thought content normal.         Judgment: Judgment normal.         Outpatient Follow-Up  Future Appointments   Date Time Provider Department [place]   4/25/2025  9:00 AM VADIM Almanza-CNP [id number] [place]   9/26/2025 10:10 AM Isela Monahan DO [id number] [place]         Corwin Harding MD  PGY1 internal medicine resident

## 2025-04-09 NOTE — DISCHARGE INSTRUCTIONS
"You were treated for infectious mononucleosis (\"mono\") which caused abnormalities in your blood counts and liver tests as well as an enlarged spleen. Treatment for this is supportive. Please rest and drink plenty of fluids.     Please follow up with your primary care provider within 7 days for hospital follow up. Please call to make this appointment. Your PCP will follow up your labs and make sure that abnormalities continue to improve.     Please avoid contact sports or activities that put you at high risk for injury for about 3 to 4 weeks    Please take your medications as prescribed. You were also noted to have iron deficiency anemia. You received 2 doses of IV iron in the hospital. Please continue taking your oral iron supplement when you go home.     If you have any new or worsening symptoms seek medical attention.    Thank you for allowing us to participate in your care!    -[facility].   "

## 2025-04-09 NOTE — NURSING NOTE
Discharge instructions provided, new medications delivered to the bedside and explained, patient is to follow up with her PCP for further care.

## 2025-04-09 NOTE — CARE PLAN
The patient's goals for the shift include feeling comfortable.    The clinical goals for the shift include remaining hemodynamically stable through the end of the shift.    Over the shift, the patient did make progress on these goals.  Pt able to sleep through the night without c/o pain.  No acute events overnight.  V/S remained stable.  Call light within reach.

## 2025-04-11 ENCOUNTER — PATIENT OUTREACH (OUTPATIENT)
Dept: PRIMARY CARE | Facility: CLINIC | Age: 25
End: 2025-04-11
Payer: MEDICAID

## 2025-04-11 LAB
ATRIAL RATE: 93 BPM
P AXIS: 19 DEGREES
P OFFSET: 148 MS
P ONSET: 97 MS
PR INTERVAL: 240 MS
Q ONSET: 217 MS
QRS COUNT: 16 BEATS
QRS DURATION: 122 MS
QT INTERVAL: 368 MS
QTC CALCULATION(BAZETT): 457 MS
QTC FREDERICIA: 426 MS
R AXIS: 47 DEGREES
T AXIS: 10 DEGREES
T OFFSET: 401 MS
VENTRICULAR RATE: 93 BPM

## 2025-04-11 NOTE — PROGRESS NOTES
Discharge Facility: [facility]  Discharge Diagnosis:   Infectious mononucleosis  Mild transaminitis  Iron deficiency anemia    Admission Date: 4/8/2025  Discharge Date: 4/9/2025    PCP Appointment Date: 4/22/2025  Specialist Appointment Date:   -ob/gyn 9/26/2025    Hospital Encounter and Summary Linked: Yes  ED to Hosp-Admission (Discharged) with Rufina Duffy MD; Delilah Narvaez MD (04/07/2025)   See discharge assessment below for further details.    Issues Requiring Follow-Up  Patient advised to avoid contact sports for the next 3 to 4 weeks.  Patient advised to take Ferosul 325 mg 1 tablet by mouth every other day  CBC and CMP in 1 week  Anemia    Hospital Course  Yanelis Carrillo is a 24 y.o. female with past medical history significant for 3rd degree heart block s/p pacemaker, anxiety, depression, anemia, who is presenting to [facility] from home due to body aches and abdominal pain.  The patient reports that she has had diffuse bodyaches with upper abdominal pain for the past 1 week.  She is still able to eat, but notes that symptoms are slightly worse when eating.  She visited urgent care twice due to the symptoms and was prescribed a Z-Antwon, but had an episode of emesis after taking her Z-Antwon yesterday.  She endorses some fevers, chills, and chest discomfort.  Denies any dysuria, hematuria, diarrhea, constipation.  Last bowel movement was yesterday.    Wrap Up  Wrap Up Additional Comments: LEXA trevino with patient. She was admitted to [facility] on 4/8/2005 with mononucleosis. Discharged home with no home care needs on 4/9/2025. Patient stated that she was starting to feel a little better. She still has a lot of fatigue. She is eating and drinking. Reviewed her medications. She has no issues with obtaining the new medications. She stated that she did take some tylenol yesterday. Has not taking any today as of yet. Patient does have an appt with PCP for hospital follow up on 4/22/2025. Patient voiced no questions  or concerns at this time. She does have my contact information if any non-emergent needs arise. (4/11/2025 12:50 PM)  Call End Time: 1255 (4/11/2025 12:50 PM)    Engagement  Call Start Time: 1250 (4/11/2025 12:50 PM)    Medications  Medications reviewed with patient/caregiver?: Yes (4/11/2025 12:50 PM)  Is the patient having any side effects they believe may be caused by any medication additions or changes?: No (4/11/2025 12:50 PM)  Does the patient have all medications ordered at discharge?: Yes (4/11/2025 12:50 PM)  Care Management Interventions: No intervention needed (4/11/2025 12:50 PM)  Prescription Comments: add tylenol and ibuprofen   stop: zithromax (4/11/2025 12:50 PM)  Is the patient taking all medications as directed (includes completed medication regime)?: Yes (4/11/2025 12:50 PM)  Medication Comments: see med list (4/11/2025 12:50 PM)    Appointments  Does the patient have a primary care provider?: Yes (4/11/2025 12:50 PM)  Care Management Interventions: Verified appointment date/time/provider (4/11/2025 12:50 PM)  Has the patient kept scheduled appointments due by today?: Yes (4/11/2025 12:50 PM)  Care Management Interventions: Advised patient to keep appointment (4/11/2025 12:50 PM)    Self Management  What is the home health agency?: none (4/11/2025 12:50 PM)  Has home health visited the patient within 72 hours of discharge?: Not applicable (4/11/2025 12:50 PM)  What Durable Medical Equipment (DME) was ordered?: n/a (4/11/2025 12:50 PM)    Patient Teaching  Does the patient have access to their discharge instructions?: Yes (4/11/2025 12:50 PM)  Care Management Interventions: Reviewed instructions with patient (4/11/2025 12:50 PM)  Is the patient/caregiver able to teach back the hierarchy of who to call/visit for symptoms/problems? PCP, Specialist, Home Health nurse, Urgent Care, ED, 911: Yes (4/11/2025 12:50 PM)  Patient/Caregiver Education Comments: see wrap up (4/11/2025 12:50 PM)

## 2025-04-16 DIAGNOSIS — D50.9 IRON DEFICIENCY ANEMIA, UNSPECIFIED IRON DEFICIENCY ANEMIA TYPE: ICD-10-CM

## 2025-04-16 DIAGNOSIS — R74.01 TRANSAMINITIS: ICD-10-CM

## 2025-04-16 LAB
ALBUMIN SERPL-MCNC: 3.3 G/DL (ref 3.6–5.1)
ALP SERPL-CCNC: 244 U/L (ref 31–125)
ALT SERPL-CCNC: 65 U/L (ref 6–29)
ANION GAP SERPL CALCULATED.4IONS-SCNC: 7 MMOL/L (CALC) (ref 7–17)
AST SERPL-CCNC: 27 U/L (ref 10–30)
BILIRUB SERPL-MCNC: 0.4 MG/DL (ref 0.2–1.2)
BUN SERPL-MCNC: 11 MG/DL (ref 7–25)
CALCIUM SERPL-MCNC: 8.7 MG/DL (ref 8.6–10.2)
CHLORIDE SERPL-SCNC: 103 MMOL/L (ref 98–110)
CO2 SERPL-SCNC: 27 MMOL/L (ref 20–32)
CREAT SERPL-MCNC: 0.74 MG/DL (ref 0.5–0.96)
EGFRCR SERPLBLD CKD-EPI 2021: 116 ML/MIN/1.73M2
ERYTHROCYTE [DISTWIDTH] IN BLOOD BY AUTOMATED COUNT: 22.3 % (ref 11–15)
GLUCOSE SERPL-MCNC: 90 MG/DL (ref 65–99)
HCT VFR BLD AUTO: 33.8 % (ref 35–45)
HGB BLD-MCNC: 9.6 G/DL (ref 11.7–15.5)
MCH RBC QN AUTO: 20.2 PG (ref 27–33)
MCHC RBC AUTO-ENTMCNC: 28.4 G/DL (ref 32–36)
MCV RBC AUTO: 71.2 FL (ref 80–100)
PLATELET # BLD AUTO: 270 THOUSAND/UL (ref 140–400)
PMV BLD REES-ECKER: ABNORMAL FL
POTASSIUM SERPL-SCNC: 4.6 MMOL/L (ref 3.5–5.3)
PROT SERPL-MCNC: 6.8 G/DL (ref 6.1–8.1)
RBC # BLD AUTO: 4.75 MILLION/UL (ref 3.8–5.1)
SODIUM SERPL-SCNC: 137 MMOL/L (ref 135–146)
WBC # BLD AUTO: 6.6 THOUSAND/UL (ref 3.8–10.8)

## 2025-04-22 ENCOUNTER — APPOINTMENT (OUTPATIENT)
Dept: PRIMARY CARE | Facility: CLINIC | Age: 25
End: 2025-04-22
Payer: MEDICAID

## 2025-04-22 VITALS
SYSTOLIC BLOOD PRESSURE: 118 MMHG | TEMPERATURE: 98.2 F | RESPIRATION RATE: 16 BRPM | WEIGHT: 180 LBS | HEIGHT: 66 IN | HEART RATE: 84 BPM | OXYGEN SATURATION: 98 % | BODY MASS INDEX: 28.93 KG/M2 | DIASTOLIC BLOOD PRESSURE: 71 MMHG

## 2025-04-22 DIAGNOSIS — R74.01 TRANSAMINITIS: ICD-10-CM

## 2025-04-22 DIAGNOSIS — D50.0 IRON DEFICIENCY ANEMIA DUE TO CHRONIC BLOOD LOSS: ICD-10-CM

## 2025-04-22 DIAGNOSIS — I44.1 SECOND DEGREE AV BLOCK: ICD-10-CM

## 2025-04-22 DIAGNOSIS — B27.00 GAMMAHERPESVIRAL MONONUCLEOSIS WITHOUT COMPLICATION: Primary | ICD-10-CM

## 2025-04-22 DIAGNOSIS — R53.83 FATIGUE, UNSPECIFIED TYPE: ICD-10-CM

## 2025-04-22 DIAGNOSIS — Z95.0 PACEMAKER: ICD-10-CM

## 2025-04-22 DIAGNOSIS — N92.0 MENORRHAGIA WITH REGULAR CYCLE: Primary | ICD-10-CM

## 2025-04-22 DIAGNOSIS — L65.9 HAIR LOSS: ICD-10-CM

## 2025-04-22 PROCEDURE — 1036F TOBACCO NON-USER: CPT | Performed by: NURSE PRACTITIONER

## 2025-04-22 PROCEDURE — 3008F BODY MASS INDEX DOCD: CPT | Performed by: NURSE PRACTITIONER

## 2025-04-22 PROCEDURE — 3074F SYST BP LT 130 MM HG: CPT | Performed by: NURSE PRACTITIONER

## 2025-04-22 PROCEDURE — 3078F DIAST BP <80 MM HG: CPT | Performed by: NURSE PRACTITIONER

## 2025-04-22 PROCEDURE — 99495 TRANSJ CARE MGMT MOD F2F 14D: CPT | Performed by: NURSE PRACTITIONER

## 2025-04-22 ASSESSMENT — ENCOUNTER SYMPTOMS
ENDOCRINE NEGATIVE: 1
EYES NEGATIVE: 1
GASTROINTESTINAL NEGATIVE: 1
PSYCHIATRIC NEGATIVE: 1
RESPIRATORY NEGATIVE: 1
PALPITATIONS: 0
MUSCULOSKELETAL NEGATIVE: 1
CONSTITUTIONAL NEGATIVE: 1
CARDIOVASCULAR NEGATIVE: 1
NEUROLOGICAL NEGATIVE: 1
ALLERGIC/IMMUNOLOGIC NEGATIVE: 1
HEMATOLOGIC/LYMPHATIC NEGATIVE: 1

## 2025-04-22 NOTE — PROGRESS NOTES
"Subjective   Yanelis Carrillo is a 24 y.o. female who presents for Hospital Follow-up.      Patient presents for a Hospital Follow up.    [place] [facility] [place] Diagnosis:   Infectious mononucleosis  Mild transaminitis  Iron deficiency anemia    To explain bleeding- has 5-6 days 1-2 days uses 6 ultra tampons, cycle every 28 days.   Trying to eat health and work out now she is on a break because of mono- her heart problem scares her, hard with her pacemaker heart is beating faster so she is getting tired easy feels like she Is going to pass out when she works out.      Admission Date: 4/8/2025  Discharge Date: 4/9/2025    Discharge Diagnosis  Infectious mononucleosis  Mild transaminitis  Iron deficiency anemia             Review of Systems   Constitutional: Negative.    HENT: Negative.     Eyes: Negative.    Respiratory: Negative.     Cardiovascular: Negative.  Negative for chest pain, palpitations and leg swelling.   Gastrointestinal: Negative.    Endocrine: Negative.    Genitourinary: Negative.    Musculoskeletal: Negative.    Skin: Negative.    Allergic/Immunologic: Negative.    Neurological: Negative.    Hematological: Negative.    Psychiatric/Behavioral: Negative.     All other systems reviewed and are negative.      Objective   /71 (BP Location: Left arm, Patient Position: Sitting, BP Cuff Size: Adult)   Pulse 84   Temp 36.8 °C (98.2 °F) (Temporal)   Resp 16   Ht 1.676 m (5' 6\")   Wt 81.6 kg (180 lb)   LMP 04/21/2025   SpO2 98%   BMI 29.05 kg/m²       Physical Exam  Vitals reviewed.   HENT:      Head: Normocephalic.      Mouth/Throat:      Pharynx: Oropharynx is clear.      Comments: Clear pnd   No pharyngitis     Cardiovascular:      Rate and Rhythm: Normal rate and regular rhythm.      Pulses: Normal pulses.      Heart sounds: Normal heart sounds.   Pulmonary:      Effort: Pulmonary effort is normal.      Breath sounds: Normal breath sounds.   Abdominal:      " General: Abdomen is flat. Bowel sounds are normal.      Palpations: Abdomen is soft.      Tenderness: There is no abdominal tenderness. There is no right CVA tenderness, left CVA tenderness, guarding or rebound. Negative signs include Hubbard's sign, Rovsing's sign, McBurney's sign, psoas sign and obturator sign.      Hernia: No hernia is present.   Neurological:      General: No focal deficit present.      Mental Status: She is alert and oriented to person, place, and time. Mental status is at baseline.         Assessment & Plan  Gammaherpesviral mononucleosis without complication         Iron deficiency anemia due to chronic blood loss    Orders:    CBC and Auto Differential; Future    Iron and TIBC; Future    Transaminitis    Orders:    Comprehensive Metabolic Panel; Future    Second degree AV block    Orders:    Referral to Cardiac Rehab; Future    Referral to Cardiac Electrophysiology; Future    Pacemaker    Orders:    Referral to Cardiac Electrophysiology; Future  With activity intolerance and trouble with exercise due to pacemaker.   Follow up with EP and discuss cardiac rehab to learn limitations and safety of exercise while being monitored.   Will speak with cardiac rehab department if needed.   Hair loss         Fatigue, unspecified type    Orders:    TSH with reflex to Free T4 if abnormal; Future    Thyroid Peroxidase (TPO) Antibody; Future         Daniela Self, APRN-CNP

## 2025-04-24 ENCOUNTER — TELEPHONE (OUTPATIENT)
Dept: OBSTETRICS AND GYNECOLOGY | Facility: HOSPITAL | Age: 25
End: 2025-04-24
Payer: MEDICAID

## 2025-04-24 NOTE — TELEPHONE ENCOUNTER
----- Message from Daniela Self sent at 4/22/2025 10:49 AM EDT -----  Hello, messaging you on behalf of Miss Carrillo. She is trying to conceive and has been trying to 8 months. I did discuss with her that her labs are not reflective of a state to sustain safe pregnancy. She was recently hospitalized with mono, transaminatits , and severe anemia- she was given 3 iron transfusions and is now on oral iron. I mentioned the possibility of putting her on birth control OCP for a few months to get her counts to improve but she would like your thoughts on this as well. Appreciate your input.

## 2025-04-25 ENCOUNTER — APPOINTMENT (OUTPATIENT)
Dept: PRIMARY CARE | Facility: CLINIC | Age: 25
End: 2025-04-25
Payer: COMMERCIAL

## 2025-04-25 ENCOUNTER — PATIENT OUTREACH (OUTPATIENT)
Dept: PRIMARY CARE | Facility: CLINIC | Age: 25
End: 2025-04-25
Payer: MEDICAID

## 2025-04-30 ENCOUNTER — OFFICE VISIT (OUTPATIENT)
Dept: CARDIOLOGY | Facility: CLINIC | Age: 25
End: 2025-04-30
Payer: MEDICAID

## 2025-04-30 VITALS
WEIGHT: 180 LBS | HEIGHT: 66 IN | DIASTOLIC BLOOD PRESSURE: 68 MMHG | SYSTOLIC BLOOD PRESSURE: 114 MMHG | BODY MASS INDEX: 28.93 KG/M2 | HEART RATE: 62 BPM

## 2025-04-30 DIAGNOSIS — I44.2 THIRD DEGREE HEART BLOCK: Primary | ICD-10-CM

## 2025-04-30 DIAGNOSIS — Z95.0 PACEMAKER: ICD-10-CM

## 2025-04-30 PROCEDURE — 99214 OFFICE O/P EST MOD 30 MIN: CPT | Performed by: INTERNAL MEDICINE

## 2025-04-30 PROCEDURE — 93000 ELECTROCARDIOGRAM COMPLETE: CPT | Performed by: INTERNAL MEDICINE

## 2025-04-30 PROCEDURE — 3078F DIAST BP <80 MM HG: CPT | Performed by: INTERNAL MEDICINE

## 2025-04-30 PROCEDURE — 3008F BODY MASS INDEX DOCD: CPT | Performed by: INTERNAL MEDICINE

## 2025-04-30 PROCEDURE — 3074F SYST BP LT 130 MM HG: CPT | Performed by: INTERNAL MEDICINE

## 2025-04-30 PROCEDURE — 1036F TOBACCO NON-USER: CPT | Performed by: INTERNAL MEDICINE

## 2025-04-30 NOTE — PROGRESS NOTES
Referring Provider: No ref. provider found  Reason for Consult: Congenital Heart block    History of Present Illness:      Yanelis Carrillo is a 24 y.o. year old female patient with a history significant for suspected she initially presented to congenital heart block who is here today for follow-up.    [facility] in mid November with complaints of chest pain and lightheadedness.  She was found to be in complete heart block at that time.  A year earlier, she was pregnant, and had an EKG which was read as second-degree AV block at that time, but in retrospect was likely complete heart block.  She then had an echocardiogram, which showed a normal LV ejection fraction.  Her heart rhythm was read as atrial fibrillation at that time, but in retrospect it also seems to have been complete heart block.  Going back to mid November, she was transferred to [facility].  A cardiac MRI was obtained, which was completed normal.  Based on this, she underwent implantation of a dual-chamber Medtronic pacemaker with a left bundle branch area lead on 11/17/2023.    She did well after pacemaker implantation was discharged from the hospital.  After the procedure, she was complaining of an intermittent cough.  A chest x-ray was obtained to ensure that there was no lead dislodgment that could have been causing intermittent phrenic stimulation.  Chest x-ray showed no lead dislodgment.  Device interrogation since implantation have also shown a normally functioning device.    She has 2 main complaints today.  1 she notes that when she exercises her heart pound significantly.  This only happens at high heart rates as she is not used to increasing her heart rate this high.  Secondly, she sometimes notices a forceful heartbeat when she lays on her left side.  I tried to reproduce it with various maneuvers on the pacemaker interrogated today and cannot reproduce this feeling.  However, she did have a similar feeling when  the device lost capture during threshold testing, so I believe it may be related to PVCs and post PVC pause.  Her device was fully interrogated during the visit as well.  It shows about 9.5 years of battery life remaining.  The threshold in the right ventricle is 1.0 V at 0.4 ms, and in the right atrium it is 0.25 V at 0.4 ms.  Capture management was turned on and output in the RV was changed to 2 V at 0.4 ms and 1 V at 0.4 ms in the RA to maximally extend battery life.    Focused Cardiovascular Problem List:  Likely congenital complete heart block, status post dual-chamber pacemaker: Presented to the ED with symptomatic lightheadedness and chest pain in November 2023.  Underwent successful dual-chamber implantation of a Medtronic pacemaker with a left bundle branch area pacing lead on 11/17/2023.  Gestational hypertension      Past Medical and Surgical History:  Ms. Carrillo  has a past medical history of Acute superficial venous thrombosis of lower extremity (11/13/2023), Anemia (11/13/2023), Anemia in pregnancy (04/08/2025), Anemia of pregnancy (04/08/2025), Ankle pain (04/08/2025), Anxiety and depression (11/13/2023), Bacterial vaginosis (04/08/2025), Edema (04/08/2025), Gestational hypertension ([facility]) (11/13/2023), Increased urinary frequency (04/08/2025), Infection due to fungus (04/08/2025), Pain in unspecified ankle and joints of unspecified foot, Pain of left lower extremity (04/08/2025), Personal history of other (healed) physical injury and trauma, Personal history of other (healed) physical injury and trauma, Personal history of other diseases of the female genital tract, Personal history of other diseases of the respiratory system, Personal history of other specified conditions, Pruritus of vagina (04/08/2025), Second degree AV block (11/13/2023), Shortness of breath (04/08/2025), Spotting during pregnancy ([facility]) (04/08/2025), Spotting in early pregnancy ([facility]) (04/08/2025), Vaginal discharge  "(2025), Vaginal itching (2025), Vaginal odor (2025), Vaginal odor (2025), and Yeast infection (2025).    has a past surgical history that includes  section, low transverse (2022) and Cardiac electrophysiology procedure (N/A, 2023).    Social History:  Social History     Tobacco Use    Smoking status: Never     Passive exposure: Never    Smokeless tobacco: Never   Substance Use Topics    Alcohol use: Never      Tobacco: Denies  Alcohol: Denies  Drug use:  Denies      Relevant Family History:   Family History   Problem Relation Name Age of Onset    Other (sinus tachycardia) Mother      Skin cancer Paternal Grandfather        Allergies:  Allergies   Allergen Reactions    Cefdinir Hives and Anaphylaxis    Metronidazole Anaphylaxis and Hives    Peanut Anaphylaxis    Tree Nut Anaphylaxis    Amoxicillin Hives    Cephalexin Hives    Doxycycline GI Upset        Medications:  Current Outpatient Medications   Medication Instructions    escitalopram (LEXAPRO) 20 mg, oral, Daily, Take 1/2 Tablet daily x 7 days, then 1 tablet PO QD    FeroSuL 325 mg, oral, Every other day    tranexamic acid (LYSTEDA) 1,300 mg, oral, 3 times daily, Start taking when period starts, 2 pills, three times per day for up to 5 days         Objective   Physical Exam:  Last Recorded Vitals:      2025     4:00 PM 2025     8:00 PM 2025    12:00 AM 2025     7:25 AM 2025     1:00 PM 2025     9:47 AM 2025     1:06 PM   Vitals   Systolic 121 116 123 122 113 118 114   Diastolic 65 54 59 58 60 71 68   BP Location Right arm Right arm Right arm Right arm Right arm Left arm Left arm   Heart Rate 76 98 72 79 71 84 62   Temp 36 °C (96.8 °F) 37.6 °C (99.7 °F) 36.4 °C (97.5 °F) 37.2 °C (99 °F) 36.5 °C (97.7 °F) 36.8 °C (98.2 °F)    Resp 18 18 18 20 19 16    Height      1.676 m (5' 6\") 1.676 m (5' 6\")   Weight (lb)      180 180   BMI      29.05 kg/m2 29.05 kg/m2   BSA (m2)      1.95 m2 " "1.95 m2   Visit Report      Report Report    Visit Vitals  /68 (BP Location: Left arm, Patient Position: Sitting)   Pulse 62   Ht 1.676 m (5' 6\")   Wt 81.6 kg (180 lb)   LMP 04/21/2025   BMI 29.05 kg/m²   OB Status Having periods   Smoking Status Never   BSA 1.95 m²      Gen: NAD, sitting comfortably  HEENT: NC/AT  Chest: Device in situ in left upper chest, no overlying erythema or tenderness.  There is a keloid over the incision  Card: RRR, no m/r/g  Pulm: Clear to auscultation bilaterally  Ext: No LE edema  Neuro: No focal deficits    Diagnostic Results      My Interpretation of Reviewed Study(s):  Prior ECGs (reviewed and my interpretation):  4/30/2025: Atrial sensed ventricular paced rhythm, heart rate 62 bpm   12/6/2023: Atrial sensed ventricular paced rhythm, unipolar pacing spike.      Echocardiography:  11/2023: Normal LV ejection fraction 60%, no significant valvular disease  No echocardiogram results found for the past 12 months       Other Relevant Imaging:  Cardiac MRI 11/15/2023  1. Upper-normal LV size (EDVi 101 ml/m2) with normal systolic  function (LVEF 62%). No RWMA.  2. Normal LV wall thickness and indexed LV mass.  3. No evidence of myocardial edema/inflammation on T2 weighted  imaging.  4. No evidence of myocardial fibrosis, infarction or infiltration on  LGE imaging.  5. Upper-normal RV size (EDVi 98 ml/m2) with normal systolic function  (RVEF 51%).    Relevant Labs:  Lab Results   Component Value Date    CREATININE 0.74 04/15/2025    CREATININE 0.69 04/09/2025    CREATININE 0.80 04/08/2025    K 4.6 04/15/2025    K 4.0 04/09/2025    K 3.5 04/08/2025    HGBA1C 5.5 11/13/2023    HGBA1C 5.6 07/24/2022    HGB 9.6 (L) 04/15/2025    HGB 7.9 (L) 04/09/2025    HGB 7.8 (L) 04/08/2025    HGB 7.8 (L) 04/08/2025    INR 1.4 (H) 04/08/2025    INR 1.1 03/17/2025    INR 0.9 07/24/2022    AST 27 04/15/2025     (H) 04/09/2025     (H) 04/08/2025    ALT 65 (H) 04/15/2025     (H) " 04/09/2025     (H) 04/08/2025       Assessment/Plan   Assessment and Plan:  Yanelis Carrillo is a 24 y.o. year old female patient who is referred for management and evaluation of congenital complete heart block status post implantation of a Medtronic dual-chamber pacemaker with left bundle area pacing lead on 12/6/2023.     She is doing well today overall.  She does feel symptomatic when her heart rates are elevated, but the only way to avoid the symptoms would be to add on a beta-blocker to slow down her sinus rate.  Also the fact that she is so anemic is likely contributing to her symptoms and this should improve with correction of the anemia.  Otherwise, I will lower the upper tracking rate of her device, she would have pacemaker wenckebach and likely feel worse with exertion.    Recommendations:  - Pacemaker parameters reprogrammed as above to maximally extend battery life  - Follow-up in device clinic ASAP as she has not had an in person check in a while  - Follow-up in 1 year with concurrent device check         Return to Clinic: Patient should return to the EP Clinic in 1 year     Thank you very much for allowing me to participate in the care of this patient. Please do not hesitate to contact me with any further questions or concerns.    Debra Martin MD  Clinical Cardiac Electrophysiologist  Director of Atrial Fibrillation Ablation, [facility]  Director of Ventricular Arrhythmias Research, [facility]  Office Phone Number: [phone]

## 2025-05-07 LAB
ALBUMIN SERPL-MCNC: 3.6 G/DL (ref 3.6–5.1)
ALP SERPL-CCNC: 93 U/L (ref 31–125)
ALT SERPL-CCNC: 15 U/L (ref 6–29)
ANION GAP SERPL CALCULATED.4IONS-SCNC: 8 MMOL/L (CALC) (ref 7–17)
AST SERPL-CCNC: 15 U/L (ref 10–30)
BASOPHILS # BLD AUTO: 32 CELLS/UL (ref 0–200)
BASOPHILS NFR BLD AUTO: 0.8 %
BILIRUB SERPL-MCNC: 0.4 MG/DL (ref 0.2–1.2)
BUN SERPL-MCNC: 12 MG/DL (ref 7–25)
CALCIUM SERPL-MCNC: 8.8 MG/DL (ref 8.6–10.2)
CHLORIDE SERPL-SCNC: 106 MMOL/L (ref 98–110)
CO2 SERPL-SCNC: 23 MMOL/L (ref 20–32)
CREAT SERPL-MCNC: 0.73 MG/DL (ref 0.5–0.96)
EGFRCR SERPLBLD CKD-EPI 2021: 118 ML/MIN/1.73M2
EOSINOPHIL # BLD AUTO: 132 CELLS/UL (ref 15–500)
EOSINOPHIL NFR BLD AUTO: 3.3 %
ERYTHROCYTE [DISTWIDTH] IN BLOOD BY AUTOMATED COUNT: 22.3 % (ref 11–15)
GLUCOSE SERPL-MCNC: 104 MG/DL (ref 65–99)
HCT VFR BLD AUTO: 38.6 % (ref 35–45)
HGB BLD-MCNC: 11.2 G/DL (ref 11.7–15.5)
IRON SATN MFR SERPL: 8 % (CALC) (ref 16–45)
IRON SERPL-MCNC: 41 MCG/DL (ref 40–190)
LYMPHOCYTES # BLD AUTO: 1404 CELLS/UL (ref 850–3900)
LYMPHOCYTES NFR BLD AUTO: 35.1 %
MCH RBC QN AUTO: 21.3 PG (ref 27–33)
MCHC RBC AUTO-ENTMCNC: 29 G/DL (ref 32–36)
MCV RBC AUTO: 73.2 FL (ref 80–100)
MONOCYTES # BLD AUTO: 192 CELLS/UL (ref 200–950)
MONOCYTES NFR BLD AUTO: 4.8 %
NEUTROPHILS # BLD AUTO: 2240 CELLS/UL (ref 1500–7800)
NEUTROPHILS NFR BLD AUTO: 56 %
PLATELET # BLD AUTO: 210 THOUSAND/UL (ref 140–400)
PMV BLD REES-ECKER: ABNORMAL FL
POTASSIUM SERPL-SCNC: 4.2 MMOL/L (ref 3.5–5.3)
PROT SERPL-MCNC: 6.6 G/DL (ref 6.1–8.1)
RBC # BLD AUTO: 5.27 MILLION/UL (ref 3.8–5.1)
SERVICE CMNT-IMP: ABNORMAL
SODIUM SERPL-SCNC: 137 MMOL/L (ref 135–146)
THYROPEROXIDASE AB SERPL-ACNC: 1 IU/ML
TIBC SERPL-MCNC: 529 MCG/DL (CALC) (ref 250–450)
TSH SERPL-ACNC: 1.13 MIU/L
WBC # BLD AUTO: 4 THOUSAND/UL (ref 3.8–10.8)

## 2025-05-16 DIAGNOSIS — F41.9 ANXIETY AND DEPRESSION: ICD-10-CM

## 2025-05-16 DIAGNOSIS — F32.A ANXIETY AND DEPRESSION: ICD-10-CM

## 2025-05-16 RX ORDER — ESCITALOPRAM OXALATE 20 MG/1
20 TABLET ORAL DAILY
Qty: 90 TABLET | Refills: 0 | Status: SHIPPED | OUTPATIENT
Start: 2025-05-16 | End: 2025-08-14

## 2025-05-28 ENCOUNTER — PATIENT OUTREACH (OUTPATIENT)
Dept: PRIMARY CARE | Facility: CLINIC | Age: 25
End: 2025-05-28
Payer: MEDICAID

## 2025-06-11 ENCOUNTER — HOSPITAL ENCOUNTER (OUTPATIENT)
Dept: CARDIOLOGY | Facility: HOSPITAL | Age: 25
Discharge: HOME | End: 2025-06-11
Payer: MEDICAID

## 2025-06-11 DIAGNOSIS — Z95.0 CARDIAC PACEMAKER IN SITU: ICD-10-CM

## 2025-06-11 DIAGNOSIS — I44.2 ATRIOVENTRICULAR BLOCK, COMPLETE (MULTI): ICD-10-CM

## 2025-06-11 PROCEDURE — 93296 REM INTERROG EVL PM/IDS: CPT

## 2025-06-17 ENCOUNTER — APPOINTMENT (OUTPATIENT)
Dept: CARDIOLOGY | Facility: CLINIC | Age: 25
End: 2025-06-17
Payer: MEDICAID

## 2025-06-19 ENCOUNTER — OFFICE VISIT (OUTPATIENT)
Dept: URGENT CARE | Age: 25
End: 2025-06-19
Payer: MEDICAID

## 2025-06-19 VITALS
SYSTOLIC BLOOD PRESSURE: 118 MMHG | WEIGHT: 180 LBS | OXYGEN SATURATION: 97 % | RESPIRATION RATE: 20 BRPM | TEMPERATURE: 98.9 F | DIASTOLIC BLOOD PRESSURE: 73 MMHG | HEART RATE: 102 BPM | BODY MASS INDEX: 28.93 KG/M2 | HEIGHT: 66 IN

## 2025-06-19 DIAGNOSIS — J02.9 SORE THROAT: ICD-10-CM

## 2025-06-19 DIAGNOSIS — J01.90 ACUTE SINUSITIS, RECURRENCE NOT SPECIFIED, UNSPECIFIED LOCATION: ICD-10-CM

## 2025-06-19 DIAGNOSIS — B34.8 RHINOVIRUS INFECTION: Primary | ICD-10-CM

## 2025-06-19 LAB
POC HUMAN RHINOVIRUS PCR: POSITIVE
POC INFLUENZA A VIRUS PCR: NEGATIVE
POC INFLUENZA B VIRUS PCR: NEGATIVE
POC RESPIRATORY SYNCYTIAL VIRUS PCR: NEGATIVE
POC STREPTOCOCCUS PYOGENES (GROUP A STREP) PCR: NEGATIVE
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
## 2025-06-21 ENCOUNTER — OFFICE VISIT (OUTPATIENT)
Dept: URGENT CARE | Age: 25
End: 2025-06-21
Payer: MEDICAID

## 2025-06-21 VITALS
RESPIRATION RATE: 20 BRPM | OXYGEN SATURATION: 98 % | WEIGHT: 180 LBS | HEART RATE: 90 BPM | TEMPERATURE: 98 F | DIASTOLIC BLOOD PRESSURE: 69 MMHG | BODY MASS INDEX: 28.93 KG/M2 | SYSTOLIC BLOOD PRESSURE: 112 MMHG | HEIGHT: 66 IN

## 2025-06-21 DIAGNOSIS — H65.03 NON-RECURRENT ACUTE SEROUS OTITIS MEDIA OF BOTH EARS: ICD-10-CM

## 2025-06-21 DIAGNOSIS — J01.00 ACUTE NON-RECURRENT MAXILLARY SINUSITIS: Primary | ICD-10-CM

## 2025-06-21 RX ORDER — SULFAMETHOXAZOLE AND TRIMETHOPRIM 800; 160 MG/1; MG/1
1 TABLET ORAL 2 TIMES DAILY
Qty: 10 TABLET | Refills: 0 | Status: SHIPPED | OUTPATIENT
Start: 2025-06-21 | End: 2025-06-26

## 2025-06-21 RX ORDER — AZITHROMYCIN 250 MG/1
TABLET, FILM COATED ORAL
Qty: 6 TABLET | Refills: 0 | Status: SHIPPED | OUTPATIENT
Start: 2025-06-21 | End: 2025-06-21 | Stop reason: WASHOUT

## 2025-06-21 ASSESSMENT — PATIENT HEALTH QUESTIONNAIRE - PHQ9
1. LITTLE INTEREST OR PLEASURE IN DOING THINGS: NOT AT ALL
SUM OF ALL RESPONSES TO PHQ9 QUESTIONS 1 AND 2: 0
2. FEELING DOWN, DEPRESSED OR HOPELESS: NOT AT ALL

## 2025-06-21 NOTE — PATIENT INSTRUCTIONS
Antibiotics as directed; take with food  Tylenol or Ibuprofen as needed  Decongestants, nasal saline as needed  Follow up with new or worsening symptoms
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
## 2025-06-24 ENCOUNTER — APPOINTMENT (OUTPATIENT)
Dept: CARDIOLOGY | Facility: CLINIC | Age: 25
End: 2025-06-24
Payer: MEDICAID

## 2025-06-25 ENCOUNTER — PATIENT OUTREACH (OUTPATIENT)
Dept: PRIMARY CARE | Facility: CLINIC | Age: 25
End: 2025-06-25
Payer: MEDICAID

## 2025-07-08 ENCOUNTER — APPOINTMENT (OUTPATIENT)
Dept: PRIMARY CARE | Facility: CLINIC | Age: 25
End: 2025-07-08
Payer: MEDICAID

## 2025-08-19 DIAGNOSIS — F32.A ANXIETY AND DEPRESSION: ICD-10-CM

## 2025-08-19 DIAGNOSIS — F41.9 ANXIETY AND DEPRESSION: ICD-10-CM

## 2025-08-19 RX ORDER — ESCITALOPRAM OXALATE 20 MG/1
20 TABLET ORAL DAILY
Qty: 90 TABLET | Refills: 0 | Status: SHIPPED | OUTPATIENT
Start: 2025-08-19 | End: 2025-11-17

## 2025-09-26 ENCOUNTER — APPOINTMENT (OUTPATIENT)
Dept: OBSTETRICS AND GYNECOLOGY | Facility: CLINIC | Age: 25
End: 2025-09-26
Payer: MEDICAID

## 2025-09-29 ENCOUNTER — APPOINTMENT (OUTPATIENT)
Dept: OBSTETRICS AND GYNECOLOGY | Facility: CLINIC | Age: 25
End: 2025-09-29
Payer: MEDICAID

## 2026-04-28 ENCOUNTER — APPOINTMENT (OUTPATIENT)
Dept: CARDIOLOGY | Facility: CLINIC | Age: 26
End: 2026-04-28
Payer: MEDICAID

## (undated) DEVICE — CABLE, SURGICAL, SM CLIP

## (undated) DEVICE — CATHETER, ACUMEN C315, FIXED, HIS SHAPE

## (undated) DEVICE — ENVELOPE, ANTIBACTERIAL, AIGIS RX TYRX, ABSORBABLE, LRG

## (undated) DEVICE — ACCESS KIT, MINI MAK, 4 FR X 10CM, 0.018 X 40CM, NITINOL/PLATINUM, STD NEEDLE

## (undated) DEVICE — GUIDEWIRE, INQWIRE, 3MM J, .035, 150

## (undated) DEVICE — INTRODUCER SYSTEM, PRELUDE SNAP, SPLITTABLE, HEMOSTATIC, 7FR